# Patient Record
Sex: MALE | Race: WHITE | NOT HISPANIC OR LATINO | Employment: FULL TIME | URBAN - METROPOLITAN AREA
[De-identification: names, ages, dates, MRNs, and addresses within clinical notes are randomized per-mention and may not be internally consistent; named-entity substitution may affect disease eponyms.]

---

## 2020-01-22 ENCOUNTER — TELEPHONE (OUTPATIENT)
Dept: FAMILY MEDICINE | Facility: CLINIC | Age: 45
End: 2020-01-22

## 2020-01-22 NOTE — TELEPHONE ENCOUNTER
----- Message from Yumiko Jansen sent at 1/22/2020 11:10 AM CST -----  Refill for Delaxin. [facility]. Pt #[phone]

## 2020-02-03 ENCOUNTER — OFFICE VISIT (OUTPATIENT)
Dept: FAMILY MEDICINE | Facility: CLINIC | Age: 45
End: 2020-02-03
Payer: OTHER GOVERNMENT

## 2020-02-03 VITALS
HEART RATE: 68 BPM | DIASTOLIC BLOOD PRESSURE: 66 MMHG | WEIGHT: 232 LBS | HEIGHT: 71 IN | BODY MASS INDEX: 32.48 KG/M2 | SYSTOLIC BLOOD PRESSURE: 110 MMHG

## 2020-02-03 DIAGNOSIS — G89.29 OTHER CHRONIC PAIN: ICD-10-CM

## 2020-02-03 DIAGNOSIS — F32.A DEPRESSION, UNSPECIFIED DEPRESSION TYPE: Primary | ICD-10-CM

## 2020-02-03 DIAGNOSIS — Z00.00 ROUTINE PHYSICAL EXAMINATION: ICD-10-CM

## 2020-02-03 PROCEDURE — 99213 OFFICE O/P EST LOW 20 MIN: CPT | Mod: S$GLB,,, | Performed by: PHYSICIAN ASSISTANT

## 2020-02-03 PROCEDURE — 99213 PR OFFICE/OUTPT VISIT, EST, LEVL III, 20-29 MIN: ICD-10-PCS | Mod: S$GLB,,, | Performed by: PHYSICIAN ASSISTANT

## 2020-02-03 RX ORDER — DULOXETIN HYDROCHLORIDE 20 MG/1
20 CAPSULE, DELAYED RELEASE ORAL DAILY
COMMUNITY
Start: 2020-01-11 | End: 2020-02-03 | Stop reason: SDUPTHER

## 2020-02-03 RX ORDER — DULOXETIN HYDROCHLORIDE 20 MG/1
20 CAPSULE, DELAYED RELEASE ORAL DAILY
Qty: 90 CAPSULE | Refills: 1 | Status: SHIPPED | OUTPATIENT
Start: 2020-02-03 | End: 2020-07-22 | Stop reason: SDUPTHER

## 2020-02-03 NOTE — PROGRESS NOTES
SUBJECTIVE:    Patient ID: Román Acosta Jr is a 44 y.o. male.    Chief Complaint: Follow-up (no bottles// SW)    This is a 44-year-old white male who presents today for regular follow-up.  He was previously a patient of Macario Gonzáles and I am now seeing him.  Upon chart review it is evident that he is just taking Cymbalta for history of anxiety and depression.  He reports that he has been doing well. Really noticing a great improvement with the addition of the cymbalta. 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 Tried all sorts of minimally invasive things that have not helped. Dr. ESPERANZA Marroquin does not think he is a candidate for spine surgery.      No visits with results within 6 Month(s) from this visit.   Latest known visit with results is:   No results found for any previous visit.       History reviewed. No pertinent past medical history.  History reviewed. No pertinent surgical history.  History reviewed. No pertinent family history.    Marital Status:   Alcohol History:  reports that he does not drink alcohol.  Tobacco History:  reports that he has been smoking. His smokeless tobacco use includes snuff.  Drug History:  has no drug history on file.    Review of patient's allergies indicates:  No Known Allergies    Current Outpatient Medications:     DULoxetine (CYMBALTA) 20 MG capsule, Take 1 capsule (20 mg total) by mouth once daily., Disp: 90 capsule, Rfl: 1    Review of Systems   Constitutional: Negative for activity change, fatigue, fever and unexpected weight change.   HENT: Negative for congestion.    Respiratory: Negative for apnea, cough, chest tightness and shortness of breath.    Cardiovascular: Negative for chest pain and palpitations.   Gastrointestinal: Negative for abdominal distention and abdominal pain.   Genitourinary: Negative for difficulty urinating and dysuria.   Musculoskeletal: Positive for back pain and myalgias. Negative for arthralgias.   Neurological: Negative for  "dizziness and weakness.          Objective:      Vitals:    02/03/20 1513   BP: 110/66   Pulse: 68   Weight: 105.2 kg (232 lb)   Height: 5' 10.5" (1.791 m)     Physical Exam   Constitutional: He is oriented to person, place, and time. He appears well-developed and well-nourished. No distress.   HENT:   Head: Normocephalic and atraumatic.   Eyes: Pupils are equal, round, and reactive to light.   Neck: Normal range of motion. Neck supple. No thyromegaly present.   Cardiovascular: Normal rate, regular rhythm, normal heart sounds and intact distal pulses.   Pulmonary/Chest: Effort normal and breath sounds normal.   Abdominal: Soft. Bowel sounds are normal. He exhibits no distension. There is no tenderness.   Musculoskeletal: Normal range of motion.   Neurological: He is alert and oriented to person, place, and time. No cranial nerve deficit.   Skin: Skin is warm and dry. No rash noted. No erythema.         Assessment:       1. Depression, unspecified depression type    2. Other chronic pain    3. Routine physical examination         Plan:       Depression, unspecified depression type  Comments:  Mood and anxiety appears to be doing really well.  Continue Cymbalta as is.  Orders:  -     DULoxetine (CYMBALTA) 20 MG capsule; Take 1 capsule (20 mg total) by mouth once daily.  Dispense: 90 capsule; Refill: 1    Other chronic pain  Comments:  Has noticed some great improvement with the addition of Cymbalta.  No changes today    Routine physical examination  Comments:  He will be due for some blood work over the summer.  Reviewed what was done last year for Macario.      Follow up in about 6 months (around 8/3/2020) for Annual Physical.        2/3/2020 Hector Devine PA-C    "

## 2020-07-17 DIAGNOSIS — Z00.00 ROUTINE PHYSICAL EXAMINATION: Primary | ICD-10-CM

## 2020-07-18 LAB
ALBUMIN SERPL-MCNC: 4.4 G/DL (ref 3.6–5.1)
ALBUMIN/GLOB SERPL: 1.8 (CALC) (ref 1–2.5)
ALP SERPL-CCNC: 57 U/L (ref 36–130)
ALT SERPL-CCNC: 16 U/L (ref 9–46)
AST SERPL-CCNC: 13 U/L (ref 10–40)
BASOPHILS # BLD AUTO: 68 CELLS/UL (ref 0–200)
BASOPHILS NFR BLD AUTO: 1 %
BILIRUB SERPL-MCNC: 0.4 MG/DL (ref 0.2–1.2)
BUN SERPL-MCNC: 15 MG/DL (ref 7–25)
BUN/CREAT SERPL: NORMAL (CALC) (ref 6–22)
CALCIUM SERPL-MCNC: 9.5 MG/DL (ref 8.6–10.3)
CHLORIDE SERPL-SCNC: 103 MMOL/L (ref 98–110)
CHOLEST SERPL-MCNC: 181 MG/DL
CHOLEST/HDLC SERPL: 3.6 (CALC)
CO2 SERPL-SCNC: 31 MMOL/L (ref 20–32)
CREAT SERPL-MCNC: 0.92 MG/DL (ref 0.6–1.35)
EOSINOPHIL # BLD AUTO: 204 CELLS/UL (ref 15–500)
EOSINOPHIL NFR BLD AUTO: 3 %
ERYTHROCYTE [DISTWIDTH] IN BLOOD BY AUTOMATED COUNT: 13.1 % (ref 11–15)
GFRSERPLBLD MDRD-ARVRAT: 99 ML/MIN/1.73M2
GLOBULIN SER CALC-MCNC: 2.5 G/DL (CALC) (ref 1.9–3.7)
GLUCOSE SERPL-MCNC: 94 MG/DL (ref 65–99)
HCT VFR BLD AUTO: 46.5 % (ref 38.5–50)
HDLC SERPL-MCNC: 50 MG/DL
HGB BLD-MCNC: 15.3 G/DL (ref 13.2–17.1)
LDLC SERPL CALC-MCNC: 113 MG/DL (CALC)
LYMPHOCYTES # BLD AUTO: 2040 CELLS/UL (ref 850–3900)
LYMPHOCYTES NFR BLD AUTO: 30 %
MCH RBC QN AUTO: 29.7 PG (ref 27–33)
MCHC RBC AUTO-ENTMCNC: 32.9 G/DL (ref 32–36)
MCV RBC AUTO: 90.3 FL (ref 80–100)
MONOCYTES # BLD AUTO: 490 CELLS/UL (ref 200–950)
MONOCYTES NFR BLD AUTO: 7.2 %
NEUTROPHILS # BLD AUTO: 3998 CELLS/UL (ref 1500–7800)
NEUTROPHILS NFR BLD AUTO: 58.8 %
NONHDLC SERPL-MCNC: 131 MG/DL (CALC)
PLATELET # BLD AUTO: 202 THOUSAND/UL (ref 140–400)
PMV BLD REES-ECKER: 10.6 FL (ref 7.5–12.5)
POTASSIUM SERPL-SCNC: 4.3 MMOL/L (ref 3.5–5.3)
PROT SERPL-MCNC: 6.9 G/DL (ref 6.1–8.1)
RBC # BLD AUTO: 5.15 MILLION/UL (ref 4.2–5.8)
SODIUM SERPL-SCNC: 141 MMOL/L (ref 135–146)
TRIGL SERPL-MCNC: 85 MG/DL
TSH SERPL-ACNC: 1.32 MIU/L (ref 0.4–4.5)
WBC # BLD AUTO: 6.8 THOUSAND/UL (ref 3.8–10.8)

## 2020-07-27 ENCOUNTER — OFFICE VISIT (OUTPATIENT)
Dept: FAMILY MEDICINE | Facility: CLINIC | Age: 45
End: 2020-07-27
Payer: OTHER GOVERNMENT

## 2020-07-27 DIAGNOSIS — F32.A DEPRESSION, UNSPECIFIED DEPRESSION TYPE: ICD-10-CM

## 2020-07-27 PROCEDURE — 99396 PREV VISIT EST AGE 40-64: CPT | Mod: 95,,, | Performed by: PHYSICIAN ASSISTANT

## 2020-07-27 PROCEDURE — 99396 PR PREVENTIVE VISIT,EST,40-64: ICD-10-PCS | Mod: 95,,, | Performed by: PHYSICIAN ASSISTANT

## 2020-07-27 RX ORDER — DULOXETIN HYDROCHLORIDE 20 MG/1
20 CAPSULE, DELAYED RELEASE ORAL DAILY
Qty: 90 CAPSULE | Refills: 1 | Status: SHIPPED | OUTPATIENT
Start: 2020-07-27 | End: 2021-01-18 | Stop reason: SDUPTHER

## 2020-07-27 NOTE — PROGRESS NOTES
Subjective:        The chief complaint leading to consultation is: 6 month checkup  The patient location is:  Home  Visit type: Virtual visit with synchronous audio/video or audio only  This was a video visit in lieu of in-person visit due to the coronavirus emergency. Patient acknowledged and consented to the video visit encounter.     45 yo wm presents for regular checkup and refills. Reports that he is managing pretty well. Work has stayed pretty busy. Just had annual labs done. All look stable. No new concerns or complaints at this time.      History reviewed. No pertinent surgical history.  History reviewed. No pertinent past medical history.  History reviewed. No pertinent family history.     Social History:   Marital Status:   Alcohol History:  reports no history of alcohol use.  Tobacco History:  reports that he has been smoking. His smokeless tobacco use includes snuff.  Drug History:  has no history on file for drug.    Review of patient's allergies indicates:  No Known Allergies    Current Outpatient Medications   Medication Sig Dispense Refill    DULoxetine (CYMBALTA) 20 MG capsule Take 1 capsule (20 mg total) by mouth once daily. 90 capsule 1     No current facility-administered medications for this visit.        Review of Systems   Constitutional: Negative for activity change, fatigue, fever and unexpected weight change.   HENT: Negative for congestion.    Respiratory: Negative for apnea, cough, chest tightness and shortness of breath.    Cardiovascular: Negative for chest pain and palpitations.   Gastrointestinal: Negative for abdominal distention and abdominal pain.   Genitourinary: Negative for difficulty urinating and dysuria.   Musculoskeletal: Negative for arthralgias and back pain.   Neurological: Negative for dizziness and weakness.         Objective:        Physical Exam:   Physical Exam  Constitutional:       General: He is not in acute distress.     Appearance: He is  well-developed.   HENT:      Head: Normocephalic and atraumatic.   Eyes:      Pupils: Pupils are equal, round, and reactive to light.   Neck:      Musculoskeletal: Normal range of motion and neck supple.      Thyroid: No thyromegaly.   Pulmonary:      Effort: Pulmonary effort is normal.   Abdominal:      General: Bowel sounds are normal. There is no distension.      Palpations: Abdomen is soft.      Tenderness: There is no abdominal tenderness.   Musculoskeletal: Normal range of motion.   Skin:     General: Skin is warm and dry.      Findings: No erythema or rash.   Neurological:      Mental Status: He is alert and oriented to person, place, and time.      Cranial Nerves: No cranial nerve deficit.              Assessment:       1. Depression, unspecified depression type      Plan:   Depression, unspecified depression type  Comments:  Mood and anxiety appears to be doing really well.  Continue Cymbalta as is. f/u 6 mos in person.  Orders:  -     DULoxetine (CYMBALTA) 20 MG capsule; Take 1 capsule (20 mg total) by mouth once daily.  Dispense: 90 capsule; Refill: 1      Follow up in about 6 months (around 1/27/2021) for checkup in person.    Total time spent with patient: 15min    Each patient to whom he or she provides medical services by telemedicine is:  (1) informed of the relationship between the physician and patient and the respective role of any other health care provider with respect to management of the patient; and (2) notified that he or she may decline to receive medical services by telemedicine and may withdraw from such care at any time.    This note was created using [employer] voice recognition software that occasionally misinterprets phrases or words.

## 2020-07-27 NOTE — PATIENT INSTRUCTIONS
Prevention Guidelines, Men Ages 40 to 49  Screening tests and vaccines are an important part of managing your health. Health counseling is essential, too. Below are guidelines for these, for men ages 40 to 49. Talk with your healthcare provider to make sure youre up to date on what you need.  Screening Who needs it How often   Alcohol misuse All men in this age group At routine exams   Blood pressure All men in this age group Every 2 years if your blood pressure reading is less than 120/80 mm Hg; yearly if your systolic blood pressure reading is 120 to 139 mm Hg, or your diastolic blood pressure reading is 80 to 89 mm Hg   Depression All men in this age group At routine exams   Type 2 diabetes or prediabetes All adults beginning at age 45 and adults without symptoms at any age who are overweight or obese and have 1 or more other risk factors for diabetes At least every 3 years (yearly if blood sugar has begun to rise)   Hepatitis C Men at increased risk for infection - talk with your healthcare provider At routine exams   High cholesterol or triglycerides All men ages 35 and older, and younger men at high risk for coronary artery disease At least every 5 years   HIV All men At routine exams   Obesity All men in this age group At routine exams   Prostate cancer Starting at age 45, talk to healthcare provider about risks and benefits of digital rectal exam (CLAUDIA) and prostate-specific antigen (PSA) screening1 At routine exams   Syphilis Men at increased risk for infection - talk with your healthcare provider At routine exams   Tuberculosis Men at increased risk for infection - talk with your healthcare provider Check with your healthcare provider   Vision All men in this age group Every 2 to 4 years if no risk factors for eye disease2   Vaccine Who needs it How often   Chickenpox (varicella) All men in this age group who have no record of this infection or vaccine 2 doses; the second dose should be given at least 4  weeks after the first dose   Hepatitis A Men at increased risk for infection - talk with your healthcare provider 2 doses given at least 6 months apart   Hepatitis B Men at increased risk for infection - talk with your healthcare provider 3 doses over 6 months; second dose should be given 1 month after the first dose; the third dose should be given at least 2 months after the second dose and at least 4 months after the first dose   Haemophilus influenzae Type B (HIB) Men at increased risk for infection - talk with your healthcare provider 1 to 3 doses   Influenza (flu) All men in this age group Once a year   Measles, mumps, rubella (MMR) All men in this age group who have no record of these infections or vaccines 1 or 2 doses   Meningococcal Men at increased risk for infection - talk with your healthcare provider 1 or more doses   Pneumococcal conjugate vaccine (PCV13) and pneumococcal polysaccharide vaccine (PPSV23) Men at increased risk for infection - talk with your healthcare provider PCV13: 1 dose ages 19 to 65 (protects against 13 types of pneumococcal bacteria)     PPSV23: 1 to 2 doses through age 64, or 1 dose at 65 or older (protects against 23 types of pneumococcal bacteria)      Tetanus/diphtheria/  pertussis (Td/Tdap) booster All men in this age group Td every 10 years, or a one-time dose of Tdap instead of a Td booster after age 18, then Td every 10 years   Counseling Who needs it How often   Diet and exercise Men who are overweight or obese When diagnosed, and then at routine exams   Sexually transmitted infection prevention Men at increased risk for infection - talk with your healthcare provider At routine exams   Use of daily aspirin Men ages 45 to 79 at risk for cardiovascular health problems At routine exams   Use of tobacco and the health effects it can cause All men in this age group Every exam   [address] [place] [address] [facility]   2[facility] [employer]  Date Last Reviewed:  2/1/2017  © 6254-3838 [employer], [email or web address]. [address] [place] [address], [place] [address]. All rights reserved. This information is not intended as a substitute for professional medical care. Always follow your healthcare professional's instructions.

## 2021-01-18 ENCOUNTER — OFFICE VISIT (OUTPATIENT)
Dept: FAMILY MEDICINE | Facility: CLINIC | Age: 46
End: 2021-01-18
Payer: OTHER GOVERNMENT

## 2021-01-18 VITALS
WEIGHT: 227 LBS | HEART RATE: 84 BPM | DIASTOLIC BLOOD PRESSURE: 88 MMHG | SYSTOLIC BLOOD PRESSURE: 136 MMHG | HEIGHT: 71 IN | BODY MASS INDEX: 31.78 KG/M2

## 2021-01-18 DIAGNOSIS — G43.909 MIGRAINE WITHOUT STATUS MIGRAINOSUS, NOT INTRACTABLE, UNSPECIFIED MIGRAINE TYPE: Primary | ICD-10-CM

## 2021-01-18 DIAGNOSIS — F32.A DEPRESSION, UNSPECIFIED DEPRESSION TYPE: ICD-10-CM

## 2021-01-18 PROCEDURE — 99213 PR OFFICE/OUTPT VISIT, EST, LEVL III, 20-29 MIN: ICD-10-PCS | Mod: S$GLB,,, | Performed by: PHYSICIAN ASSISTANT

## 2021-01-18 PROCEDURE — 99213 OFFICE O/P EST LOW 20 MIN: CPT | Mod: S$GLB,,, | Performed by: PHYSICIAN ASSISTANT

## 2021-01-18 RX ORDER — VITAMIN B COMPLEX
1 CAPSULE ORAL DAILY
COMMUNITY

## 2021-01-18 RX ORDER — CHOLECALCIFEROL (VITAMIN D3) 25 MCG
1000 TABLET ORAL DAILY
COMMUNITY

## 2021-01-18 RX ORDER — DULOXETIN HYDROCHLORIDE 20 MG/1
20 CAPSULE, DELAYED RELEASE ORAL DAILY
Qty: 90 CAPSULE | Refills: 1 | Status: SHIPPED | OUTPATIENT
Start: 2021-01-18 | End: 2021-07-13 | Stop reason: SDUPTHER

## 2021-01-18 RX ORDER — ZINC GLUCONATE 50 MG
50 TABLET ORAL DAILY
COMMUNITY

## 2021-01-18 RX ORDER — UBIDECARENONE 30 MG
30 CAPSULE ORAL 3 TIMES DAILY
COMMUNITY

## 2021-01-18 RX ORDER — UBROGEPANT 100 MG/1
100 TABLET ORAL DAILY PRN
Qty: 10 TABLET | Refills: 2 | Status: SHIPPED | OUTPATIENT
Start: 2021-01-18 | End: 2021-04-29 | Stop reason: SDUPTHER

## 2021-04-29 DIAGNOSIS — G43.909 MIGRAINE WITHOUT STATUS MIGRAINOSUS, NOT INTRACTABLE, UNSPECIFIED MIGRAINE TYPE: ICD-10-CM

## 2021-04-29 RX ORDER — UBROGEPANT 100 MG/1
100 TABLET ORAL DAILY PRN
Qty: 10 TABLET | Refills: 2 | Status: SHIPPED | OUTPATIENT
Start: 2021-04-29 | End: 2021-07-13 | Stop reason: SDUPTHER

## 2021-05-24 ENCOUNTER — TELEPHONE (OUTPATIENT)
Dept: FAMILY MEDICINE | Facility: CLINIC | Age: 46
End: 2021-05-24

## 2021-07-08 ENCOUNTER — TELEPHONE (OUTPATIENT)
Dept: FAMILY MEDICINE | Facility: CLINIC | Age: 46
End: 2021-07-08

## 2021-07-13 ENCOUNTER — OFFICE VISIT (OUTPATIENT)
Dept: FAMILY MEDICINE | Facility: CLINIC | Age: 46
End: 2021-07-13
Payer: OTHER GOVERNMENT

## 2021-07-13 VITALS
HEART RATE: 56 BPM | SYSTOLIC BLOOD PRESSURE: 124 MMHG | BODY MASS INDEX: 31.45 KG/M2 | DIASTOLIC BLOOD PRESSURE: 72 MMHG | WEIGHT: 224.63 LBS | HEIGHT: 71 IN

## 2021-07-13 DIAGNOSIS — F32.A DEPRESSION, UNSPECIFIED DEPRESSION TYPE: ICD-10-CM

## 2021-07-13 DIAGNOSIS — H60.90 OTITIS EXTERNA, UNSPECIFIED CHRONICITY, UNSPECIFIED LATERALITY, UNSPECIFIED TYPE: Primary | ICD-10-CM

## 2021-07-13 DIAGNOSIS — G43.909 MIGRAINE WITHOUT STATUS MIGRAINOSUS, NOT INTRACTABLE, UNSPECIFIED MIGRAINE TYPE: ICD-10-CM

## 2021-07-13 PROCEDURE — 99214 PR OFFICE/OUTPT VISIT, EST, LEVL IV, 30-39 MIN: ICD-10-PCS | Mod: S$GLB,,, | Performed by: PHYSICIAN ASSISTANT

## 2021-07-13 PROCEDURE — 99214 OFFICE O/P EST MOD 30 MIN: CPT | Mod: S$GLB,,, | Performed by: PHYSICIAN ASSISTANT

## 2021-07-13 RX ORDER — DULOXETIN HYDROCHLORIDE 20 MG/1
20 CAPSULE, DELAYED RELEASE ORAL DAILY
Qty: 90 CAPSULE | Refills: 1 | Status: SHIPPED | OUTPATIENT
Start: 2021-07-13 | End: 2022-01-13 | Stop reason: SDUPTHER

## 2021-07-13 RX ORDER — UBROGEPANT 100 MG/1
100 TABLET ORAL DAILY PRN
Qty: 10 TABLET | Refills: 2 | Status: SHIPPED | OUTPATIENT
Start: 2021-07-13 | End: 2021-11-05 | Stop reason: SDUPTHER

## 2021-07-13 RX ORDER — NEOMYCIN SULFATE, POLYMYXIN B SULFATE AND HYDROCORTISONE 10; 3.5; 1 MG/ML; MG/ML; [USP'U]/ML
3 SUSPENSION/ DROPS AURICULAR (OTIC) 3 TIMES DAILY
Qty: 6 ML | Refills: 0 | Status: SHIPPED | OUTPATIENT
Start: 2021-07-13 | End: 2021-07-23

## 2021-07-13 RX ORDER — AMOXICILLIN AND CLAVULANATE POTASSIUM 875; 125 MG/1; MG/1
1 TABLET, FILM COATED ORAL EVERY 12 HOURS
Qty: 20 TABLET | Refills: 0 | Status: SHIPPED | OUTPATIENT
Start: 2021-07-13 | End: 2021-07-23

## 2021-11-05 DIAGNOSIS — G43.909 MIGRAINE WITHOUT STATUS MIGRAINOSUS, NOT INTRACTABLE, UNSPECIFIED MIGRAINE TYPE: ICD-10-CM

## 2021-11-05 RX ORDER — UBROGEPANT 100 MG/1
100 TABLET ORAL DAILY PRN
Qty: 10 TABLET | Refills: 2 | Status: SHIPPED | OUTPATIENT
Start: 2021-11-05 | End: 2022-01-13 | Stop reason: SDUPTHER

## 2021-12-15 ENCOUNTER — TELEPHONE (OUTPATIENT)
Dept: FAMILY MEDICINE | Facility: CLINIC | Age: 46
End: 2021-12-15
Payer: OTHER GOVERNMENT

## 2021-12-15 DIAGNOSIS — G47.33 OSA (OBSTRUCTIVE SLEEP APNEA): Primary | ICD-10-CM

## 2021-12-27 ENCOUNTER — TELEPHONE (OUTPATIENT)
Dept: FAMILY MEDICINE | Facility: CLINIC | Age: 46
End: 2021-12-27
Payer: OTHER GOVERNMENT

## 2021-12-27 DIAGNOSIS — Z00.00 ROUTINE GENERAL MEDICAL EXAMINATION AT A HEALTH CARE FACILITY: Primary | ICD-10-CM

## 2021-12-27 DIAGNOSIS — Z79.899 ENCOUNTER FOR LONG-TERM (CURRENT) USE OF OTHER MEDICATIONS: ICD-10-CM

## 2022-01-08 LAB
ALBUMIN SERPL-MCNC: 4.5 G/DL (ref 3.6–5.1)
ALBUMIN/GLOB SERPL: 1.6 (CALC) (ref 1–2.5)
ALP SERPL-CCNC: 65 U/L (ref 36–130)
ALT SERPL-CCNC: 29 U/L (ref 9–46)
AST SERPL-CCNC: 18 U/L (ref 10–40)
BASOPHILS # BLD AUTO: 57 CELLS/UL (ref 0–200)
BASOPHILS NFR BLD AUTO: 0.8 %
BILIRUB SERPL-MCNC: 0.4 MG/DL (ref 0.2–1.2)
BUN SERPL-MCNC: 16 MG/DL (ref 7–25)
BUN/CREAT SERPL: ABNORMAL (CALC) (ref 6–22)
CALCIUM SERPL-MCNC: 9.8 MG/DL (ref 8.6–10.3)
CHLORIDE SERPL-SCNC: 105 MMOL/L (ref 98–110)
CHOLEST SERPL-MCNC: 190 MG/DL
CHOLEST/HDLC SERPL: 3.8 (CALC)
CO2 SERPL-SCNC: 30 MMOL/L (ref 20–32)
CREAT SERPL-MCNC: 1.17 MG/DL (ref 0.6–1.35)
EOSINOPHIL # BLD AUTO: 249 CELLS/UL (ref 15–500)
EOSINOPHIL NFR BLD AUTO: 3.5 %
ERYTHROCYTE [DISTWIDTH] IN BLOOD BY AUTOMATED COUNT: 13.8 % (ref 11–15)
GLOBULIN SER CALC-MCNC: 2.8 G/DL (CALC) (ref 1.9–3.7)
GLUCOSE SERPL-MCNC: 101 MG/DL (ref 65–99)
HCT VFR BLD AUTO: 47.6 % (ref 38.5–50)
HDLC SERPL-MCNC: 50 MG/DL
HGB BLD-MCNC: 15.3 G/DL (ref 13.2–17.1)
LDLC SERPL CALC-MCNC: 118 MG/DL (CALC)
LYMPHOCYTES # BLD AUTO: 2300 CELLS/UL (ref 850–3900)
LYMPHOCYTES NFR BLD AUTO: 32.4 %
MCH RBC QN AUTO: 29 PG (ref 27–33)
MCHC RBC AUTO-ENTMCNC: 32.1 G/DL (ref 32–36)
MCV RBC AUTO: 90.2 FL (ref 80–100)
MONOCYTES # BLD AUTO: 547 CELLS/UL (ref 200–950)
MONOCYTES NFR BLD AUTO: 7.7 %
NEUTROPHILS # BLD AUTO: 3948 CELLS/UL (ref 1500–7800)
NEUTROPHILS NFR BLD AUTO: 55.6 %
NONHDLC SERPL-MCNC: 140 MG/DL (CALC)
PLATELET # BLD AUTO: 212 THOUSAND/UL (ref 140–400)
PMV BLD REES-ECKER: 11.2 FL (ref 7.5–12.5)
POTASSIUM SERPL-SCNC: 4.8 MMOL/L (ref 3.5–5.3)
PROT SERPL-MCNC: 7.3 G/DL (ref 6.1–8.1)
RBC # BLD AUTO: 5.28 MILLION/UL (ref 4.2–5.8)
SODIUM SERPL-SCNC: 142 MMOL/L (ref 135–146)
TRIGL SERPL-MCNC: 111 MG/DL
TSH SERPL-ACNC: 1.93 MIU/L (ref 0.4–4.5)
WBC # BLD AUTO: 7.1 THOUSAND/UL (ref 3.8–10.8)

## 2022-01-13 ENCOUNTER — OFFICE VISIT (OUTPATIENT)
Dept: FAMILY MEDICINE | Facility: CLINIC | Age: 47
End: 2022-01-13
Payer: OTHER GOVERNMENT

## 2022-01-13 VITALS
HEART RATE: 68 BPM | SYSTOLIC BLOOD PRESSURE: 136 MMHG | BODY MASS INDEX: 33.04 KG/M2 | HEIGHT: 71 IN | DIASTOLIC BLOOD PRESSURE: 84 MMHG | WEIGHT: 236 LBS | OXYGEN SATURATION: 96 %

## 2022-01-13 DIAGNOSIS — G43.909 MIGRAINE WITHOUT STATUS MIGRAINOSUS, NOT INTRACTABLE, UNSPECIFIED MIGRAINE TYPE: ICD-10-CM

## 2022-01-13 DIAGNOSIS — F32.A DEPRESSION, UNSPECIFIED DEPRESSION TYPE: Primary | ICD-10-CM

## 2022-01-13 PROCEDURE — 99396 PREV VISIT EST AGE 40-64: CPT | Mod: S$GLB,,, | Performed by: PHYSICIAN ASSISTANT

## 2022-01-13 PROCEDURE — 99396 PR PREVENTIVE VISIT,EST,40-64: ICD-10-PCS | Mod: S$GLB,,, | Performed by: PHYSICIAN ASSISTANT

## 2022-01-13 RX ORDER — DULOXETIN HYDROCHLORIDE 20 MG/1
20 CAPSULE, DELAYED RELEASE ORAL DAILY
Qty: 90 CAPSULE | Refills: 1 | Status: SHIPPED | OUTPATIENT
Start: 2022-01-13 | End: 2022-08-11 | Stop reason: SDUPTHER

## 2022-01-13 RX ORDER — UBROGEPANT 100 MG/1
100 TABLET ORAL DAILY PRN
Qty: 30 TABLET | Refills: 2 | Status: SHIPPED | OUTPATIENT
Start: 2022-01-13

## 2022-01-13 NOTE — PROGRESS NOTES
SUBJECTIVE:    Patient ID: Román Acosta Jr is a 46 y.o. male.    Chief Complaint: Follow-up (6 mo f/u//no med bottles//declined flu vac//tc)    This is a 45 yo wm presenting for regular checkup and refills. He just completed labs for my review. All look stable at this time. 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 Some stress with this. Just covered with ubrelvy for migraine . Cymbalta effective for mood/anxiety.       Telephone on 2021   Component Date Value Ref Range Status    WBC 2022 7.1  3.8 - 10.8 Thousand/uL Final    RBC 2022 5.28  4.20 - 5.80 Million/uL Final    Hemoglobin 2022 15.3  13.2 - 17.1 g/dL Final    Hematocrit 2022 47.6  38.5 - 50.0 % Final    MCV 2022 90.2  80.0 - 100.0 fL Final    MCH 2022 29.0  27.0 - 33.0 pg Final    MCHC 2022 32.1  32.0 - 36.0 g/dL Final    RDW 2022 13.8  11.0 - 15.0 % Final    Platelets 2022 212  140 - 400 Thousand/uL Final    MPV 2022 11.2  7.5 - 12.5 fL Final    Neutrophils, Abs 2022 3,948  1,500 - 7,800 cells/uL Final    Lymph # 2022 2,300  850 - 3,900 cells/uL Final    Mono # 2022 547  200 - 950 cells/uL Final    Eos # 2022 249  15 - 500 cells/uL Final    Baso # 2022 57  0 - 200 cells/uL Final    Neutrophils Relative 2022 55.6  % Final    Lymph % 2022 32.4  % Final    Mono % 2022 7.7  % Final    Eosinophil % 2022 3.5  % Final    Basophil % 2022 0.8  % Final    Glucose 2022 101* 65 - 99 mg/dL Final    BUN 2022 16  7 - 25 mg/dL Final    Creatinine 2022 1.17  0.60 - 1.35 mg/dL Final    eGFR if non  2022 73  > OR = 60 mL/min/1.73m2 Final    eGFR if  2022 85  > OR = 60 mL/min/1.73m2 Final    BUN/Creatinine Ratio 2022 NOT APPLICABLE   (calc) Final    Sodium 2022 142  135 - 146 mmol/L Final     Potassium 01/07/2022 4.8  3.5 - 5.3 mmol/L Final    Chloride 01/07/2022 105  98 - 110 mmol/L Final    CO2 01/07/2022 30  20 - 32 mmol/L Final    Calcium 01/07/2022 9.8  8.6 - 10.3 mg/dL Final    Total Protein 01/07/2022 7.3  6.1 - 8.1 g/dL Final    Albumin 01/07/2022 4.5  3.6 - 5.1 g/dL Final    Globulin, Total 01/07/2022 2.8  1.9 - 3.7 g/dL (calc) Final    Albumin/Globulin Ratio 01/07/2022 1.6  1.0 - 2.5 (calc) Final    Total Bilirubin 01/07/2022 0.4  0.2 - 1.2 mg/dL Final    Alkaline Phosphatase 01/07/2022 65  36 - 130 U/L Final    AST 01/07/2022 18  10 - 40 U/L Final    ALT 01/07/2022 29  9 - 46 U/L Final    Cholesterol 01/07/2022 190  <200 mg/dL Final    HDL 01/07/2022 50  > OR = 40 mg/dL Final    Triglycerides 01/07/2022 111  <150 mg/dL Final    LDL Cholesterol 01/07/2022 118* mg/dL (calc) Final    HDL/Cholesterol Ratio 01/07/2022 3.8  <5.0 (calc) Final    Non HDL Chol. (LDL+VLDL) 01/07/2022 140* <130 mg/dL (calc) Final    TSH w/reflex to FT4 01/07/2022 1.93  0.40 - 4.50 mIU/L Final       No past medical history on file.  No past surgical history on file.  No family history on file.    Marital Status:   Alcohol History:  reports no history of alcohol use.  Tobacco History:  reports that he has been smoking. His smokeless tobacco use includes snuff.  Drug History:  has no history on file for drug use.    Review of patient's allergies indicates:  No Known Allergies    Current Outpatient Medications:     ascorbic acid, vitamin C, (VITAMIN C) 100 MG tablet, Take 100 mg by mouth once daily., Disp: , Rfl:     b complex vitamins capsule, Take 1 capsule by mouth once daily., Disp: , Rfl:     cetirizine HCl (ALLERGY RELIEF, CETIRIZINE, ORAL), Take by mouth., Disp: , Rfl:     co-enzyme Q-10 30 mg capsule, Take 30 mg by mouth 3 (three) times daily., Disp: , Rfl:     DULoxetine (CYMBALTA) 20 MG capsule, Take 1 capsule (20 mg total) by mouth once daily., Disp: 90 capsule, Rfl: 1    ubrogepant  "(UBROGEPANT) 100 mg tablet, Take 1 tablet (100 mg total) by mouth daily as needed for Migraine., Disp: 30 tablet, Rfl: 2    vitamin D (VITAMIN D3) 1000 units Tab, Take 1,000 Units by mouth once daily., Disp: , Rfl:     vitamin E 100 UNIT capsule, Take 100 Units by mouth once daily., Disp: , Rfl:     zinc gluconate 50 mg tablet, Take 50 mg by mouth once daily., Disp: , Rfl:     Review of Systems   Constitutional: Negative for activity change, fatigue, fever and unexpected weight change.   HENT: Negative for congestion.    Respiratory: Negative for apnea, cough, chest tightness and shortness of breath.    Cardiovascular: Negative for chest pain and palpitations.   Gastrointestinal: Negative for abdominal distention and abdominal pain.   Genitourinary: Negative for difficulty urinating and dysuria.   Musculoskeletal: Negative for arthralgias and back pain.   Neurological: Positive for headaches (Migraine). Negative for dizziness and weakness.          Objective:      Vitals:    01/13/22 1107   BP: 136/84   Pulse: 68   SpO2: 96%   Weight: 107 kg (236 lb)   Height: 5' 10.5" (1.791 m)     Physical Exam  Constitutional:       General: He is not in acute distress.     Appearance: He is well-developed.   HENT:      Head: Normocephalic and atraumatic.   Eyes:      Pupils: Pupils are equal, round, and reactive to light.   Neck:      Thyroid: No thyromegaly.   Cardiovascular:      Rate and Rhythm: Normal rate and regular rhythm.      Heart sounds: Normal heart sounds.   Pulmonary:      Effort: Pulmonary effort is normal.      Breath sounds: Normal breath sounds.   Abdominal:      General: Bowel sounds are normal. There is no distension.      Palpations: Abdomen is soft.      Tenderness: There is no abdominal tenderness.   Musculoskeletal:         General: Normal range of motion.      Cervical back: Normal range of motion and neck supple.   Skin:     General: Skin is warm and dry.      Findings: No erythema or rash. "   Neurological:      Mental Status: He is alert and oriented to person, place, and time.      Cranial Nerves: No cranial nerve deficit.           Assessment:       1. Depression, unspecified depression type    2. Migraine without status migrainosus, not intractable, unspecified migraine type         Plan:       Depression, unspecified depression type  Comments:  Mood and anxiety appears to be doing really well.  Continue Cymbalta as is. f/u 6 mos in person.  Orders:  -     DULoxetine (CYMBALTA) 20 MG capsule; Take 1 capsule (20 mg total) by mouth once daily.  Dispense: 90 capsule; Refill: 1    Migraine without status migrainosus, not intractable, unspecified migraine type  Comments:  Going to try on ubrelvy as well. Abortive therapy. Will get coupon card offline and take to pharmacy.  Orders:  -     ubrogepant (UBROGEPANT) 100 mg tablet; Take 1 tablet (100 mg total) by mouth daily as needed for Migraine.  Dispense: 30 tablet; Refill: 2      Follow up in about 6 months (around 7/13/2022) for checkup.        1/13/2022 Hector Devine PA-C

## 2022-01-13 NOTE — PATIENT INSTRUCTIONS
"Patient Education       Migraines in Adults   The Basics   Written by the doctors and editors at [facility]   What are migraines? -- Migraines are a kind of headache that can also involve other symptoms. Migraines can affect both adults and children. They are more common in women than in men. Migraines often start off mild and then get worse.  What are the symptoms of migraines in adults? -- Symptoms can include:  · Headache - The headache gets worse over several hours and is usually throbbing. It often affects 1 side of the head.  · Nausea and sometimes vomiting  · Feeling sensitive to light and noise - Lying down in a quiet, dark room often helps.  · Aura - Some people have something called a migraine "aura." An aura is a symptom or feeling that happens before or during the migraine headache. Each person's aura is different, but in most cases the aura affects the vision. You might see flashing lights, bright spots, or zig-zag lines, or lose part of your vision. Or you might have numbness and tingling of the lips, lower face, and fingers of 1 hand. Some people hear sounds or have ringing in their ears as part of their aura. The aura usually lasts a few minutes to an hour and then goes away, but most often lasts 15 to 30 minutes.  Women who get migraines with aura usually cannot take birth control pills. That's because they might increase the risk of stroke.  Many people get other symptoms of migraine that happen several hours or even a day before the headache. Doctors call these "premonitory" or "prodromal" symptoms. They might include yawning, feeling depressed, irritability, food cravings, constipation, or a stiff neck.  Is there a test for migraines? -- No. There is no test. But your doctor should be able to tell if you have migraines by doing an exam and learning about your symptoms.  Should I see a doctor or nurse? -- Yes. If you think you are having migraines, you should talk to your doctor or nurse. You should " "also see a doctor or nurse if your migraines get worse or more frequent, or if you have new symptoms.  Is there anything I can do to prevent migraines? -- Yes. Some people find that their migraines are triggered by certain things. If you can avoid some of these things, you can lower your chances of getting migraines.  You can also keep a "headache calendar." In the calendar, write down every time you have a migraine and what you ate and did before it started. That way you can find out if there is anything you should avoid eating or doing. You can also write down what medicine you took and whether or not it helped.  Common migraine triggers include:  · Stress  · Hormonal changes  · Skipping meals or not eating enough  · Changes in the weather  · Sleeping too much or too little  · Bright or flashing lights  · Drinking alcohol  · Certain drinks or foods, such as red wine, aged cheese, and hot dogs  If your migraines are frequent or severe, your doctor can suggest others ways to help prevent them. For example, it might help to learn relaxation techniques and ways to manage stress. There are also medicines that can help.  Some women get migraines just before or during their period. Medicine can help with this, too.  How are migraines treated? -- There are many different medicines that can help with migraines. Your doctor can help you find the best treatment for your situation.  For mild migraines, your doctor might suggest an over-the-counter medicine such as acetaminophen (sample brand name: Tylenol), ibuprofen (sample brand names: Advil, Motrin), or naproxen (sample brand name: Aleve). There is also a medicine that combines acetaminophen, aspirin, and caffeine (sample brand name: Excedrin).  For more severe migraines, there are prescription medicines that can help. Some, such as medicines called triptans, help to relieve the pain from a migraine attack. Other prescription medicines can help to make migraine attacks " happen less often. If you have severe nausea or vomiting with your migraines, there are medicines that can help with that, too.   Do not try to treat frequent migraines on your own with non-prescription pain medicines. Taking non-prescription pain medicines too often can actually cause more headaches later.  What if I want to get pregnant? -- If you want to get pregnant, talk to your doctor or nurse about it before you start trying. Some medicines used to treat and prevent migraines are not safe during pregnancy, so you might need to switch medicines before you get pregnant.  Some women notice that their migraines actually get better during pregnancy and breastfeeding. This is related to hormonal changes in the body.  All topics are updated as new evidence becomes available and our peer review process is complete.  This topic retrieved from [employer] on: Sep 21, 2021.  Topic 602465 Version 5.0  Release: 29.4.2 - C29.263  © 2021 UpToDate, Inc. and/or its affiliates. All rights reserved.  Consumer Information Use and Disclaimer   This information is not specific medical advice and does not replace information you receive from your health care provider. This is only a brief summary of general information. It does NOT include all information about conditions, illnesses, injuries, tests, procedures, treatments, therapies, discharge instructions or life-style choices that may apply to you. You must talk with your health care provider for complete information about your health and treatment options. This information should not be used to decide whether or not to accept your health care provider's advice, instructions or recommendations. Only your health care provider has the knowledge and training to provide advice that is right for you. The use of this information is governed by the [employer] End User License Agreement, available at https://www.Q.ME.[employer]/en/solutions/Nubee/about/kedar.The use of [employer] content  is governed by the [facility] Terms of Use. ©2021 [employer]. All rights reserved.  Copyright   © 2021 [employer] and/or its affiliates. All rights reserved.

## 2022-03-08 ENCOUNTER — TELEPHONE (OUTPATIENT)
Dept: FAMILY MEDICINE | Facility: CLINIC | Age: 47
End: 2022-03-08
Payer: OTHER GOVERNMENT

## 2022-03-08 NOTE — TELEPHONE ENCOUNTER
----- Message from Domi Moreau sent at 3/8/2022  9:10 AM CST -----  Patient wife(Chikis) called and stated that the pharmacy advised her that they need a PA for his ubrogepant if any questions [phone] he uses [facility] in [place]

## 2022-03-09 ENCOUNTER — TELEPHONE (OUTPATIENT)
Dept: FAMILY MEDICINE | Facility: CLINIC | Age: 47
End: 2022-03-09
Payer: OTHER GOVERNMENT

## 2022-03-09 NOTE — TELEPHONE ENCOUNTER
Informed pt  Ubrelvy co pay card is at  ready for . He voiced understanding. Pt states he had to use it last year.

## 2022-03-09 NOTE — TELEPHONE ENCOUNTER
----- Message from Yoselin Castillo sent at 3/9/2022 11:29 AM CST -----  The PA for Ubrelvy has been denied because the pt is not being treated by a Neurologist for his dx and does not have a history of tried and failed medications. The pt has been notified. Do we have any co-pay cards?

## 2022-03-15 ENCOUNTER — TELEPHONE (OUTPATIENT)
Dept: FAMILY MEDICINE | Facility: CLINIC | Age: 47
End: 2022-03-15
Payer: OTHER GOVERNMENT

## 2022-03-15 NOTE — TELEPHONE ENCOUNTER
----- Message from Domi Moreau sent at 3/15/2022 10:39 AM CDT -----  Patient wife (Chikis)called and stated she is still having problems with the pharmacy [facility]  they keep telling her that they still need a PA for his ubrogepant she advised them that  this has been sent to them multiple times but they stated they still do not have one please give her a call at [phone]

## 2022-03-16 ENCOUNTER — TELEPHONE (OUTPATIENT)
Dept: FAMILY MEDICINE | Facility: CLINIC | Age: 47
End: 2022-03-16
Payer: OTHER GOVERNMENT

## 2022-03-16 NOTE — TELEPHONE ENCOUNTER
----- Message from Yoselin Castillo sent at 3/16/2022 10:08 AM CDT -----  BREE  Pt came in office and signed the Appeal paperwork for Ubrelvy. It has been signed, scanned in media and right faxed jeffrey  Appeals Dept @ [phone] confirmed successful.  Appeal can take up to 30 days I did give the pt the coupon card that was up front as well.

## 2022-03-29 ENCOUNTER — TELEPHONE (OUTPATIENT)
Dept: FAMILY MEDICINE | Facility: CLINIC | Age: 47
End: 2022-03-29
Payer: OTHER GOVERNMENT

## 2022-03-29 NOTE — TELEPHONE ENCOUNTER
----- Message from Yoselin Castillo sent at 3/29/2022  9:57 AM CDT -----  The Appeal for Ubrelvy has been Denied per insurance the original denial stands.  A copy of the full appeal denial letter is scanned in the media. I have not notified the pt was waiting to hear back.

## 2022-03-30 NOTE — TELEPHONE ENCOUNTER
This pt should be using the coupon card. Please call and see. We were just doing the PA under new insurance.

## 2022-06-27 NOTE — PATIENT INSTRUCTIONS
Chronic Pain  Pain serves an important role. It lets you know something is wrong that needs your attention. When the body heals, pain normally goes away.  When pain lasts longer than six months, it is called chronic pain. This is pain that is present even after the body has healed. Chronic pain can cause mood problems and get in the way of your relationships and your daily life.  A number of conditions can cause chronic pain. Some of the more common include:  · Previous surgery  · An old injury  · Infection  · Diseases such as diabetes  · Nerve damage  · Back injury  · Arthritis  · Migraine or other headaches  · Fibromyalgia  · Cancer  Depression and stress can make chronic pain symptoms worse. In some cases, a cause for the pain cannot be found.   Treatment  Treatment can greatly reduce pain. In many cases, pain can become less severe, occur less often, and interfere less with your daily life. Chronic pain is often treated with a combination of medicines, therapies, and lifestyle changes. You will work closely with your healthcare provider to find a treatment plan that works best for you.  · Ask your healthcare provider for a referral to a pain management specialty center. These can provide the most recent and proven pain management strategies, along with emotional support and comprehensive services.  · Several different types of medicines may be prescribed for chronic pain. Work with your healthcare provider to develop a medicine plan that helps manage your pain.  · Physical therapy can be very effective in helping reduce certain types of chronic pain.  · Occupational therapy teaches you how to do routine tasks of daily living in ways that lessen your discomfort.  · Psychological therapy can help you cope better with stress and pain.  · Other therapies such as meditation, yoga, biofeedback, massage, and acupuncture can also help manage chronic pain.  · Changing certain habits can help reduce chronic pain. They  include:  ¨ Eating healthy  ¨ Developing an exercise routine  ¨ Getting enough sleep at night  ¨ Stopping smoking and limiting alcohol use  ¨ Losing excess weight  Follow-up care  Follow up with your healthcare provider as advised. Let your healthcare provider know if your current treatment plan is working or if changes are needed.  Resources  For more information, contact:  · American [place] for Headache Society www.achenet.org  · American Chronic Pain Association www.theacpa.org 051-334-0627  Date Last Reviewed: 7/26/2015 © 2000-2017 [employer]. [address] [place] [id number]. All rights reserved. This information is not intended as a substitute for professional medical care. Always follow your healthcare professional's instructions.         negative Regular rate & rhythm, normal S1, S2; no murmurs, gallops or rubs; no S3, S4

## 2022-07-25 ENCOUNTER — TELEPHONE (OUTPATIENT)
Dept: FAMILY MEDICINE | Facility: CLINIC | Age: 47
End: 2022-07-25

## 2022-07-25 NOTE — TELEPHONE ENCOUNTER
----- Message from Alicia Woodson sent at 7/25/2022  9:24 AM CDT -----  The patient had to cancel his last appointment. He was out of town. The 1st appointment Hector has is in October. Can you get him in sooner 1st thing in the morning. He will need some refills. His wife Chikis's  # [phone]

## 2022-08-10 ENCOUNTER — TELEPHONE (OUTPATIENT)
Dept: FAMILY MEDICINE | Facility: CLINIC | Age: 47
End: 2022-08-10

## 2022-08-11 ENCOUNTER — TELEPHONE (OUTPATIENT)
Dept: FAMILY MEDICINE | Facility: CLINIC | Age: 47
End: 2022-08-11

## 2022-08-11 DIAGNOSIS — F32.A DEPRESSION, UNSPECIFIED DEPRESSION TYPE: ICD-10-CM

## 2022-08-11 RX ORDER — DULOXETIN HYDROCHLORIDE 20 MG/1
20 CAPSULE, DELAYED RELEASE ORAL DAILY
Qty: 90 CAPSULE | Refills: 1 | Status: SHIPPED | OUTPATIENT
Start: 2022-08-11 | End: 2022-08-12 | Stop reason: SDUPTHER

## 2022-08-11 NOTE — TELEPHONE ENCOUNTER
----- Message from Yuliana Jose MA sent at 8/11/2022  8:30 AM CDT -----  Regarding: refill  Patient;s appt cancelled by our office for today. Needs refill on duloxetine  Larry malik, ms  [phone]

## 2022-08-11 NOTE — TELEPHONE ENCOUNTER
----- Message from Yuliana Jose MA sent at 8/11/2022  8:33 AM CDT -----  Regarding: reschedule patient appt  [facility] office cancelled todays appt. Patient would like to come in asap PTSD  [phone]

## 2022-08-12 DIAGNOSIS — F32.A DEPRESSION, UNSPECIFIED DEPRESSION TYPE: ICD-10-CM

## 2022-08-12 RX ORDER — DULOXETIN HYDROCHLORIDE 20 MG/1
20 CAPSULE, DELAYED RELEASE ORAL DAILY
Qty: 90 CAPSULE | Refills: 1 | Status: SHIPPED | OUTPATIENT
Start: 2022-08-12 | End: 2023-01-26 | Stop reason: SDUPTHER

## 2022-08-12 NOTE — TELEPHONE ENCOUNTER
----- Message from Domi Moreau sent at 8/12/2022  8:56 AM CDT -----  Patient called and stated that he was to have his medicine for his PTSD called in and it has not been done he stated that he has been out for the last four days please give him a call at [phone]

## 2022-08-25 ENCOUNTER — OFFICE VISIT (OUTPATIENT)
Dept: FAMILY MEDICINE | Facility: CLINIC | Age: 47
End: 2022-08-25
Payer: OTHER GOVERNMENT

## 2022-08-25 VITALS
HEIGHT: 71 IN | BODY MASS INDEX: 31.5 KG/M2 | DIASTOLIC BLOOD PRESSURE: 82 MMHG | HEART RATE: 80 BPM | WEIGHT: 225 LBS | SYSTOLIC BLOOD PRESSURE: 124 MMHG | OXYGEN SATURATION: 99 %

## 2022-08-25 DIAGNOSIS — G43.909 MIGRAINE WITHOUT STATUS MIGRAINOSUS, NOT INTRACTABLE, UNSPECIFIED MIGRAINE TYPE: ICD-10-CM

## 2022-08-25 DIAGNOSIS — Z12.11 SCREENING FOR MALIGNANT NEOPLASM OF COLON: ICD-10-CM

## 2022-08-25 DIAGNOSIS — N52.9 ERECTILE DYSFUNCTION, UNSPECIFIED ERECTILE DYSFUNCTION TYPE: ICD-10-CM

## 2022-08-25 DIAGNOSIS — F32.A DEPRESSION, UNSPECIFIED DEPRESSION TYPE: Primary | ICD-10-CM

## 2022-08-25 PROCEDURE — 99396 PR PREVENTIVE VISIT,EST,40-64: ICD-10-PCS | Mod: S$GLB,,, | Performed by: PHYSICIAN ASSISTANT

## 2022-08-25 PROCEDURE — 99396 PREV VISIT EST AGE 40-64: CPT | Mod: S$GLB,,, | Performed by: PHYSICIAN ASSISTANT

## 2022-08-25 RX ORDER — TADALAFIL 20 MG/1
20 TABLET ORAL DAILY
Qty: 12 TABLET | Refills: 2 | Status: SHIPPED | OUTPATIENT
Start: 2022-08-25 | End: 2023-08-21 | Stop reason: SDUPTHER

## 2022-08-25 NOTE — PROGRESS NOTES
SUBJECTIVE:    Patient ID: Román Acosta Jr is a 46 y.o. male.    Chief Complaint: Annual Exam (Annual wellness exam//no med bottles//cologard ordered//tc)    This is a 46-year-old male who presents today for annual wellness exam.  Full labs were completed in January of this year.  All labs were stable at this time.  He is agreeable to Cologuard to satisfy the colon cancer screening recommendation.  He is not having any changes in bowel habits and no family history significant for colon cancer.  He takes Ubrelvy for migraine abortive therapy and Cymbalta for mood/anxiety. Work stays busy. He is now in management position. Reports exercising more and staying healthy. Only complaint is some ED sxs. Reports that he did do well with cialis in the past.      No visits with results within 6 Month(s) from this visit.   Latest known visit with results is:   Telephone on 12/27/2021   Component Date Value Ref Range Status    WBC 01/07/2022 7.1  3.8 - 10.8 Thousand/uL Final    RBC 01/07/2022 5.28  4.20 - 5.80 Million/uL Final    Hemoglobin 01/07/2022 15.3  13.2 - 17.1 g/dL Final    Hematocrit 01/07/2022 47.6  38.5 - 50.0 % Final    MCV 01/07/2022 90.2  80.0 - 100.0 fL Final    MCH 01/07/2022 29.0  27.0 - 33.0 pg Final    MCHC 01/07/2022 32.1  32.0 - 36.0 g/dL Final    RDW 01/07/2022 13.8  11.0 - 15.0 % Final    Platelets 01/07/2022 212  140 - 400 Thousand/uL Final    MPV 01/07/2022 11.2  7.5 - 12.5 fL Final    Neutrophils, Abs 01/07/2022 3,948  1,500 - 7,800 cells/uL Final    Lymph # 01/07/2022 2,300  850 - 3,900 cells/uL Final    Mono # 01/07/2022 547  200 - 950 cells/uL Final    Eos # 01/07/2022 249  15 - 500 cells/uL Final    Baso # 01/07/2022 57  0 - 200 cells/uL Final    Neutrophils Relative 01/07/2022 55.6  % Final    Lymph % 01/07/2022 32.4  % Final    Mono % 01/07/2022 7.7  % Final    Eosinophil % 01/07/2022 3.5  % Final    Basophil % 01/07/2022 0.8  % Final    Glucose 01/07/2022 101 (A) 65 -  99 mg/dL Final    BUN 01/07/2022 16  7 - 25 mg/dL Final    Creatinine 01/07/2022 1.17  0.60 - 1.35 mg/dL Final    eGFR if non  01/07/2022 73  > OR = 60 mL/min/1.73m2 Final    eGFR if  01/07/2022 85  > OR = 60 mL/min/1.73m2 Final    BUN/Creatinine Ratio 01/07/2022 NOT APPLICABLE  6 - 22 (calc) Final    Sodium 01/07/2022 142  135 - 146 mmol/L Final    Potassium 01/07/2022 4.8  3.5 - 5.3 mmol/L Final    Chloride 01/07/2022 105  98 - 110 mmol/L Final    CO2 01/07/2022 30  20 - 32 mmol/L Final    Calcium 01/07/2022 9.8  8.6 - 10.3 mg/dL Final    Total Protein 01/07/2022 7.3  6.1 - 8.1 g/dL Final    Albumin 01/07/2022 4.5  3.6 - 5.1 g/dL Final    Globulin, Total 01/07/2022 2.8  1.9 - 3.7 g/dL (calc) Final    Albumin/Globulin Ratio 01/07/2022 1.6  1.0 - 2.5 (calc) Final    Total Bilirubin 01/07/2022 0.4  0.2 - 1.2 mg/dL Final    Alkaline Phosphatase 01/07/2022 65  36 - 130 U/L Final    AST 01/07/2022 18  10 - 40 U/L Final    ALT 01/07/2022 29  9 - 46 U/L Final    Cholesterol 01/07/2022 190  <200 mg/dL Final    HDL 01/07/2022 50  > OR = 40 mg/dL Final    Triglycerides 01/07/2022 111  <150 mg/dL Final    LDL Cholesterol 01/07/2022 118 (A) mg/dL (calc) Final    HDL/Cholesterol Ratio 01/07/2022 3.8  <5.0 (calc) Final    Non HDL Chol. (LDL+VLDL) 01/07/2022 140 (A) <130 mg/dL (calc) Final    TSH w/reflex to FT4 01/07/2022 1.93  0.40 - 4.50 mIU/L Final       No past medical history on file.  No past surgical history on file.  No family history on file.    Marital Status:   Alcohol History:  reports no history of alcohol use.  Tobacco History:  reports that he has been smoking. His smokeless tobacco use includes snuff.  Drug History:  has no history on file for drug use.    Review of patient's allergies indicates:  No Known Allergies    Current Outpatient Medications:     ascorbic acid, vitamin C, (VITAMIN C) 100 MG tablet, Take 100 mg by mouth once daily., Disp: ,  "Rfl:     b complex vitamins capsule, Take 1 capsule by mouth once daily., Disp: , Rfl:     cetirizine HCl (ALLERGY RELIEF, CETIRIZINE, ORAL), Take by mouth., Disp: , Rfl:     co-enzyme Q-10 30 mg capsule, Take 30 mg by mouth 3 (three) times daily., Disp: , Rfl:     DULoxetine (CYMBALTA) 20 MG capsule, Take 1 capsule (20 mg total) by mouth once daily., Disp: 90 capsule, Rfl: 1    tadalafiL (CIALIS) 20 MG Tab, Take 1 tablet (20 mg total) by mouth once daily., Disp: 12 tablet, Rfl: 2    ubrogepant (UBROGEPANT) 100 mg tablet, Take 1 tablet (100 mg total) by mouth daily as needed for Migraine., Disp: 30 tablet, Rfl: 2    vitamin D (VITAMIN D3) 1000 units Tab, Take 1,000 Units by mouth once daily., Disp: , Rfl:     vitamin E 100 UNIT capsule, Take 100 Units by mouth once daily., Disp: , Rfl:     zinc gluconate 50 mg tablet, Take 50 mg by mouth once daily., Disp: , Rfl:     Review of Systems   Constitutional: Negative for activity change, fatigue, fever and unexpected weight change.   HENT: Negative for congestion.    Respiratory: Negative for apnea, cough, chest tightness and shortness of breath.    Cardiovascular: Negative for chest pain and palpitations.   Gastrointestinal: Negative for abdominal distention and abdominal pain.   Genitourinary: Negative for difficulty urinating and dysuria.        ED   Musculoskeletal: Negative for arthralgias and back pain.   Neurological: Negative for dizziness and weakness.          Objective:      Vitals:    08/25/22 1503   BP: 124/82   Pulse: 80   SpO2: 99%   Weight: 102.1 kg (225 lb)   Height: 5' 10.5" (1.791 m)     Physical Exam  Constitutional:       General: He is not in acute distress.     Appearance: He is well-developed.   HENT:      Head: Normocephalic and atraumatic.   Eyes:      Pupils: Pupils are equal, round, and reactive to light.   Neck:      Thyroid: No thyromegaly.   Cardiovascular:      Rate and Rhythm: Normal rate and regular rhythm.      Heart sounds: " Normal heart sounds.   Pulmonary:      Effort: Pulmonary effort is normal.      Breath sounds: Normal breath sounds.   Abdominal:      General: Bowel sounds are normal. There is no distension.      Palpations: Abdomen is soft.      Tenderness: There is no abdominal tenderness.   Musculoskeletal:         General: Normal range of motion.      Cervical back: Normal range of motion and neck supple.   Skin:     General: Skin is warm and dry.      Findings: No erythema or rash.   Neurological:      Mental Status: He is alert and oriented to person, place, and time.      Cranial Nerves: No cranial nerve deficit.           Assessment:       1. Depression, unspecified depression type    2. Screening for malignant neoplasm of colon    3. Migraine without status migrainosus, not intractable, unspecified migraine type    4. Erectile dysfunction, unspecified erectile dysfunction type         Plan:       Depression, unspecified depression type  Comments:  mood and anxiety is stable, continue as is.    Screening for malignant neoplasm of colon  Comments:  agreeable to cologuard. order sent  Orders:  -     Cologuard Screening (Multitarget Stool DNA); Future; Expected date: 08/25/2022    Migraine without status migrainosus, not intractable, unspecified migraine type  Comments:  no longer able to get ubrelvy now through insurance. He will keep what he has on hand for prn use if needed.  Orders:  -     tadalafiL (CIALIS) 20 MG Tab; Take 1 tablet (20 mg total) by mouth once daily.  Dispense: 12 tablet; Refill: 2    Erectile dysfunction, unspecified erectile dysfunction type  Comments:  rx ofr cialis. has been effective in the past.      Follow up in about 6 months (around 2/25/2023).        8/25/2022 Hector Devine PA-C

## 2023-01-04 ENCOUNTER — TELEPHONE (OUTPATIENT)
Dept: FAMILY MEDICINE | Facility: CLINIC | Age: 48
End: 2023-01-04

## 2023-01-04 NOTE — TELEPHONE ENCOUNTER
----- Message from Domi Moreau sent at 1/4/2023  2:09 PM CST -----  Patient wife (Chikis)called and stated that the patient went to have his tooth pulled and they advised him that his pressure was very high and they advised him to go see his PCP tomorrow please give her a call at [phone]

## 2023-01-04 NOTE — TELEPHONE ENCOUNTER
Spoke to pts wife  Pt went to get his tooth pulled today and pts bp was 195/125 at the dentist. Pts bp was retaken at dentist after a little bit and it was 160/125. Pt told his wife he has been having fluttering in his chest and feeling foggy.  Per Irma Pt needs to go to ER. Pts wife verbalized understanding and is going to take pt to ER

## 2023-01-05 ENCOUNTER — TELEPHONE (OUTPATIENT)
Dept: FAMILY MEDICINE | Facility: CLINIC | Age: 48
End: 2023-01-05

## 2023-01-05 NOTE — TELEPHONE ENCOUNTER
I dont see any ER records to review? If he is newly on amlodipine 5mg I would have him come in for nurse check in one week, we can increase dose to 10mg at that time if we need to and go from there. Schedule him in first available with me

## 2023-01-05 NOTE — TELEPHONE ENCOUNTER
Spoke to pt   He stated went to get his tooth pulled yesterday and his bp was 195/125 the dentist made the pt wait a little bit and retook bp it was 160/125. Pt called here per the dentist to be seen it was recommended by Irma pt going to ER. Pt went to ER he was given medication to lower his BP not sure which medication. Pt went to Dr. Hoffman office this morning pts bp was 163/105 and was prescribed amlodipine 5mg. Dr. Montez told the pt to follow up with Morgan about the Bp being high.     No appointments.

## 2023-01-05 NOTE — TELEPHONE ENCOUNTER
----- Message from Yuliana Santos sent at 1/5/2023  9:50 AM CST -----  Pt need an appt with Morgan to discuss BP and medication Cardiologist prescribe for BP. [phone]

## 2023-01-05 NOTE — TELEPHONE ENCOUNTER
Spoke to pt per javier verbatim below. Pt went to [facility] in [place]. Pt has started medications today and got a bp machine. Let pt know to check Bp few times a day and write the bp and time down also to bring it to NV. NV is scheduled and pt stated he will call back to schedule appointment    FYI

## 2023-01-06 ENCOUNTER — TELEPHONE (OUTPATIENT)
Dept: FAMILY MEDICINE | Facility: CLINIC | Age: 48
End: 2023-01-06

## 2023-01-06 NOTE — TELEPHONE ENCOUNTER
Spoke to pt and he stated he got the amlodipine Dr. Montez has prescribed and his BP is still high.   Pts BP after taking Amlodipine is at 6:10 am it was 153/106 and at 8:42 it was 155/108. Pt taking bp while on the phone it was 172/114 .  Just started amlodipine yesterday

## 2023-01-06 NOTE — TELEPHONE ENCOUNTER
He is causing his pressure to be elevated by rechecking it too many times.  He needs to relax, allow the medication time to work and come in for nurse visit next week as we initially scheduled him for.  If at that time we need to increase the dose to 10 mg we can do so at that time

## 2023-01-06 NOTE — TELEPHONE ENCOUNTER
----- Message from Domi Moreau sent at 1/6/2023 10:02 AM CST -----  VM 01/06/23 @ 9:06 AM :patient called and stated that he would like to speak to someone about issues he is having with his blood pressure medicine please give him a call at [phone]

## 2023-01-12 ENCOUNTER — CLINICAL SUPPORT (OUTPATIENT)
Dept: FAMILY MEDICINE | Facility: CLINIC | Age: 48
End: 2023-01-12
Payer: OTHER GOVERNMENT

## 2023-01-12 VITALS — SYSTOLIC BLOOD PRESSURE: 138 MMHG | DIASTOLIC BLOOD PRESSURE: 98 MMHG

## 2023-01-12 DIAGNOSIS — I10 HYPERTENSION, UNSPECIFIED TYPE: Primary | ICD-10-CM

## 2023-01-12 RX ORDER — AMLODIPINE BESYLATE 10 MG/1
10 TABLET ORAL DAILY
Qty: 30 TABLET | Refills: 11 | Status: SHIPPED | OUTPATIENT
Start: 2023-01-12 | End: 2024-01-04 | Stop reason: SDUPTHER

## 2023-01-12 NOTE — TELEPHONE ENCOUNTER
Patient here for BP check, scanned BP log.  152/100 and 138/98  Taking Amlodipine 5 mg 1 tab daily.  Per Morgan, increase Amlodipine to 10 mg 1 tab daily.  Order Pended.

## 2023-01-12 NOTE — PROGRESS NOTES
Patient here for Blood pressure check. Patient brought log. Patient states symptoms of daily headache, daily facial flush feeling, pain behind bilateral eyes with burning, no nausea no vomiting. Prior sinus infection and tooth extraction on 01/5/2023. Done with antibiotics amoxicillin x 3 caps daily.    Amlodipine Besylate 5 mg, 1 tab daily in morning    152/100 right arm  138/98 left arm      Per Morgan increase Amlodipine 5 mg to Amlodipine 10 mg 1 tab daily

## 2023-01-26 ENCOUNTER — CLINICAL SUPPORT (OUTPATIENT)
Dept: FAMILY MEDICINE | Facility: CLINIC | Age: 48
End: 2023-01-26
Payer: OTHER GOVERNMENT

## 2023-01-26 VITALS — DIASTOLIC BLOOD PRESSURE: 88 MMHG | SYSTOLIC BLOOD PRESSURE: 138 MMHG

## 2023-01-26 DIAGNOSIS — F32.A DEPRESSION, UNSPECIFIED DEPRESSION TYPE: ICD-10-CM

## 2023-01-26 DIAGNOSIS — I10 ESSENTIAL HYPERTENSION: Primary | ICD-10-CM

## 2023-01-26 RX ORDER — LISINOPRIL 5 MG/1
5 TABLET ORAL DAILY
Qty: 90 TABLET | Refills: 1 | Status: SHIPPED | OUTPATIENT
Start: 2023-01-26 | End: 2023-07-13 | Stop reason: SDUPTHER

## 2023-01-26 RX ORDER — DULOXETIN HYDROCHLORIDE 20 MG/1
20 CAPSULE, DELAYED RELEASE ORAL DAILY
Qty: 90 CAPSULE | Refills: 1 | Status: SHIPPED | OUTPATIENT
Start: 2023-01-26 | End: 2023-08-03 | Stop reason: SDUPTHER

## 2023-01-26 NOTE — PROGRESS NOTES
Pt here for BP check and brought logs, takes amlodipine 10 mg daily.    Per Morgan pt is to start Lisinopril 5mg daily with the amlodipine 10 mg daily and recheck in 2 weeks.

## 2023-02-09 ENCOUNTER — CLINICAL SUPPORT (OUTPATIENT)
Dept: FAMILY MEDICINE | Facility: CLINIC | Age: 48
End: 2023-02-09
Payer: OTHER GOVERNMENT

## 2023-02-09 VITALS — SYSTOLIC BLOOD PRESSURE: 118 MMHG | DIASTOLIC BLOOD PRESSURE: 80 MMHG

## 2023-02-09 NOTE — PROGRESS NOTES
Pt here for Bp check brought logs, pt takes amlodipine 10 mg, and lisinopril 5mg daily.      Per Morgan BP looks great continue current regimen and keep f/u appt.

## 2023-02-10 ENCOUNTER — PATIENT OUTREACH (OUTPATIENT)
Dept: ADMINISTRATIVE | Facility: HOSPITAL | Age: 48
End: 2023-02-10

## 2023-02-10 ENCOUNTER — PATIENT MESSAGE (OUTPATIENT)
Dept: ADMINISTRATIVE | Facility: HOSPITAL | Age: 48
End: 2023-02-10

## 2023-02-10 NOTE — PROGRESS NOTES
Chart review completed 02/10/2023.  Care Everywhere updated and reviewed. [facility], [facility] and [facility] reviewed. No new HM items found.     Patient portal message sent regarding overdue HM      No orders to review.     Immunizations reconciled and reviewed.

## 2023-02-17 ENCOUNTER — PATIENT OUTREACH (OUTPATIENT)
Dept: ADMINISTRATIVE | Facility: HOSPITAL | Age: 48
End: 2023-02-17

## 2023-02-17 NOTE — PROGRESS NOTES
"Attempted to outreach patient regarding overdue/ due HM via "[employer]". No response after a week. Now sending outreach via mail out letter.    Immunizations reviewed.    "

## 2023-02-17 NOTE — LETTER
February 17, 2023    Román Acosta Jr  [address] [place] [address]             [place] [id number] [facility] [address]  Phone: [phone] Román Farooq Acosta Jr  [address] [place] [address]    Dear Román Acosta Jr,    [facility] is committed to your overall health. To help you get the most out of each of your visits, we will review your information to make sure you are up to date on all of your recommended tests and/or procedures.      Dr. Hector Devine PA-C  has found that your chart shows you may be due for       Health Maintenance Due   Topic Date Due    Hepatitis C Screening  Never done    COVID-19 Vaccine (1) Never done    Pneumococcal Vaccines (Age 0-64) (1 - PCV) Never done    HIV Screening  Never done    Hemoglobin A1c (Diabetic Prevention Screening)  Never done    TETANUS VACCINE  02/03/2016    Colorectal Cancer Screening  Never done    Influenza Vaccine (1) 09/01/2022      If you have had any of the above done at another facility, please let me know and I will request a copy of your report so that your record at [facility] will be complete. If you would like to schedule this/ any of these, please contact the clinic at [phone].    Thank You,    Your [facility] Team,  SARITHA Messer LPN, [facility]  Phone [phone]  Fax [phone]

## 2023-02-24 ENCOUNTER — OFFICE VISIT (OUTPATIENT)
Dept: FAMILY MEDICINE | Facility: CLINIC | Age: 48
End: 2023-02-24
Payer: OTHER GOVERNMENT

## 2023-02-24 VITALS
HEART RATE: 76 BPM | BODY MASS INDEX: 31.22 KG/M2 | DIASTOLIC BLOOD PRESSURE: 86 MMHG | HEIGHT: 71 IN | SYSTOLIC BLOOD PRESSURE: 136 MMHG | WEIGHT: 223 LBS

## 2023-02-24 DIAGNOSIS — G43.909 MIGRAINE WITHOUT STATUS MIGRAINOSUS, NOT INTRACTABLE, UNSPECIFIED MIGRAINE TYPE: ICD-10-CM

## 2023-02-24 DIAGNOSIS — I10 ESSENTIAL HYPERTENSION: Primary | ICD-10-CM

## 2023-02-24 DIAGNOSIS — F32.A DEPRESSION, UNSPECIFIED DEPRESSION TYPE: ICD-10-CM

## 2023-02-24 PROCEDURE — 99214 OFFICE O/P EST MOD 30 MIN: CPT | Mod: S$GLB,,, | Performed by: PHYSICIAN ASSISTANT

## 2023-02-24 PROCEDURE — 99214 PR OFFICE/OUTPT VISIT, EST, LEVL IV, 30-39 MIN: ICD-10-PCS | Mod: S$GLB,,, | Performed by: PHYSICIAN ASSISTANT

## 2023-02-24 RX ORDER — OMEPRAZOLE 20 MG/1
20 CAPSULE, DELAYED RELEASE ORAL
COMMUNITY

## 2023-02-24 NOTE — PROGRESS NOTES
SUBJECTIVE:    Patient ID: Román Acosta Jr is a 47 y.o. male.    Chief Complaint: Follow-up (6mth, went over meds verbally// SW)    This is a 47 year old male who presents today for regular follow up. Blood pressure at 136/86 today, has been monitored every two weeks at nurse visits. Patient reports decreased stress since leaving his previous job, currently looking for new work. 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 Patient has not been able to fill his Ulbrelvy or cialis prescription due to insurance. Denies any chest pain, shortness of breath, or recent illness.       No visits with results within 6 Month(s) from this visit.   Latest known visit with results is:   Telephone on 12/27/2021   Component Date Value Ref Range Status    WBC 01/07/2022 7.1  3.8 - 10.8 Thousand/uL Final    RBC 01/07/2022 5.28  4.20 - 5.80 Million/uL Final    Hemoglobin 01/07/2022 15.3  13.2 - 17.1 g/dL Final    Hematocrit 01/07/2022 47.6  38.5 - 50.0 % Final    MCV 01/07/2022 90.2  80.0 - 100.0 fL Final    MCH 01/07/2022 29.0  27.0 - 33.0 pg Final    MCHC 01/07/2022 32.1  32.0 - 36.0 g/dL Final    RDW 01/07/2022 13.8  11.0 - 15.0 % Final    Platelets 01/07/2022 212  140 - 400 Thousand/uL Final    MPV 01/07/2022 11.2  7.5 - 12.5 fL Final    Neutrophils, Abs 01/07/2022 3,948  1,500 - 7,800 cells/uL Final    Lymph # 01/07/2022 2,300  850 - 3,900 cells/uL Final    Mono # 01/07/2022 547  200 - 950 cells/uL Final    Eos # 01/07/2022 249  15 - 500 cells/uL Final    Baso # 01/07/2022 57  0 - 200 cells/uL Final    Neutrophils Relative 01/07/2022 55.6  % Final    Lymph % 01/07/2022 32.4  % Final    Mono % 01/07/2022 7.7  % Final    Eosinophil % 01/07/2022 3.5  % Final    Basophil % 01/07/2022 0.8  % Final    Glucose 01/07/2022 101 (H)  65 - 99 mg/dL Final    BUN 01/07/2022 16  7 - 25 mg/dL Final    Creatinine 01/07/2022 1.17  0.60 - 1.35 mg/dL  Final    eGFR if non  01/07/2022 73  > OR = 60 mL/min/1.73m2 Final    eGFR if African American 01/07/2022 85  > OR = 60 mL/min/1.73m2 Final    BUN/Creatinine Ratio 01/07/2022 NOT APPLICABLE  6 - 22 (calc) Final    Sodium 01/07/2022 142  135 - 146 mmol/L Final    Potassium 01/07/2022 4.8  3.5 - 5.3 mmol/L Final    Chloride 01/07/2022 105  98 - 110 mmol/L Final    CO2 01/07/2022 30  20 - 32 mmol/L Final    Calcium 01/07/2022 9.8  8.6 - 10.3 mg/dL Final    Total Protein 01/07/2022 7.3  6.1 - 8.1 g/dL Final    Albumin 01/07/2022 4.5  3.6 - 5.1 g/dL Final    Globulin, Total 01/07/2022 2.8  1.9 - 3.7 g/dL (calc) Final    Albumin/Globulin Ratio 01/07/2022 1.6  1.0 - 2.5 (calc) Final    Total Bilirubin 01/07/2022 0.4  0.2 - 1.2 mg/dL Final    Alkaline Phosphatase 01/07/2022 65  36 - 130 U/L Final    AST 01/07/2022 18  10 - 40 U/L Final    ALT 01/07/2022 29  9 - 46 U/L Final    Cholesterol 01/07/2022 190  <200 mg/dL Final    HDL 01/07/2022 50  > OR = 40 mg/dL Final    Triglycerides 01/07/2022 111  <150 mg/dL Final    LDL Cholesterol 01/07/2022 118 (H)  mg/dL (calc) Final    HDL/Cholesterol Ratio 01/07/2022 3.8  <5.0 (calc) Final    Non HDL Chol. (LDL+VLDL) 01/07/2022 140 (H)  <130 mg/dL (calc) Final    TSH w/reflex to FT4 01/07/2022 1.93  0.40 - 4.50 mIU/L Final       History reviewed. No pertinent past medical history.  History reviewed. No pertinent surgical history.  History reviewed. No pertinent family history.    Marital Status:   Alcohol History:  reports no history of alcohol use.  Tobacco History:  reports that he has been smoking. His smokeless tobacco use includes snuff.  Drug History:  has no history on file for drug use.    Review of patient's allergies indicates:  No Known Allergies    Current Outpatient Medications:     amLODIPine (NORVASC) 10 MG tablet, Take 1 tablet (10 mg total) by mouth once daily., Disp: 30 tablet, Rfl: 11    ascorbic acid, vitamin C, (VITAMIN C) 100 MG tablet, Take  "100 mg by mouth once daily., Disp: , Rfl:     b complex vitamins capsule, Take 1 capsule by mouth once daily., Disp: , Rfl:     cetirizine HCl (ALLERGY RELIEF, CETIRIZINE, ORAL), Take by mouth., Disp: , Rfl:     co-enzyme Q-10 30 mg capsule, Take 30 mg by mouth 3 (three) times daily., Disp: , Rfl:     DULoxetine (CYMBALTA) 20 MG capsule, Take 1 capsule (20 mg total) by mouth once daily., Disp: 90 capsule, Rfl: 1    lisinopriL (PRINIVIL,ZESTRIL) 5 MG tablet, Take 1 tablet (5 mg total) by mouth once daily., Disp: 90 tablet, Rfl: 1    omeprazole (PRILOSEC) 20 MG capsule, Take 20 mg by mouth., Disp: , Rfl:     tadalafiL (CIALIS) 20 MG Tab, Take 1 tablet (20 mg total) by mouth once daily., Disp: 12 tablet, Rfl: 2    ubrogepant (UBROGEPANT) 100 mg tablet, Take 1 tablet (100 mg total) by mouth daily as needed for Migraine., Disp: 30 tablet, Rfl: 2    vitamin D (VITAMIN D3) 1000 units Tab, Take 1,000 Units by mouth once daily., Disp: , Rfl:     vitamin E 100 UNIT capsule, Take 100 Units by mouth once daily., Disp: , Rfl:     zinc gluconate 50 mg tablet, Take 50 mg by mouth once daily., Disp: , Rfl:     Review of Systems   Constitutional:  Negative for activity change, fatigue, fever and unexpected weight change.   HENT:  Negative for congestion.    Respiratory:  Negative for apnea, cough, chest tightness and shortness of breath.    Cardiovascular:  Negative for chest pain and palpitations.   Gastrointestinal:  Negative for abdominal distention, abdominal pain, blood in stool, constipation and diarrhea.   Genitourinary:  Negative for difficulty urinating and dysuria.   Musculoskeletal:  Negative for arthralgias and back pain.   Neurological:  Negative for dizziness and weakness.        Objective:      Vitals:    02/24/23 0743   BP: 136/86   Pulse: 76   Weight: 101.2 kg (223 lb)   Height: 5' 10.5" (1.791 m)     Physical Exam  Constitutional:       General: He is not in acute distress.     Appearance: He is well-developed. "   HENT:      Head: Normocephalic and atraumatic.   Eyes:      Pupils: Pupils are equal, round, and reactive to light.   Neck:      Thyroid: No thyromegaly.   Cardiovascular:      Rate and Rhythm: Normal rate and regular rhythm.      Heart sounds: Normal heart sounds. No murmur heard.    No friction rub.   Pulmonary:      Effort: Pulmonary effort is normal.      Breath sounds: Normal breath sounds. No wheezing or rhonchi.   Abdominal:      General: Bowel sounds are normal. There is no distension.      Palpations: Abdomen is soft.      Tenderness: There is no abdominal tenderness.   Musculoskeletal:         General: Normal range of motion.      Cervical back: Normal range of motion and neck supple.      Right lower leg: No edema.      Left lower leg: No edema.   Skin:     General: Skin is warm and dry.      Findings: No erythema or rash.   Neurological:      Mental Status: He is alert and oriented to person, place, and time.      Cranial Nerves: No cranial nerve deficit.         Assessment:       1. Essential hypertension    2. Depression, unspecified depression type    3. Migraine without status migrainosus, not intractable, unspecified migraine type         Plan:       Essential hypertension  Comments:  BP is looking better now and rechecked by me. will continue as is.    Depression, unspecified depression type  Comments:  Mood and anxiety is doing better. certainly appears to be a situational type thing. will maintain as is.    Migraine without status migrainosus, not intractable, unspecified migraine type  Comments:  Reports that they have been much better controlled since stress is better in line.      Follow up in about 6 months (around 8/24/2023) for Annual Physical.        2/24/2023 Hector Devine PA-C

## 2023-04-11 ENCOUNTER — PATIENT MESSAGE (OUTPATIENT)
Dept: ADMINISTRATIVE | Facility: HOSPITAL | Age: 48
End: 2023-04-11

## 2023-04-17 ENCOUNTER — PATIENT MESSAGE (OUTPATIENT)
Dept: ADMINISTRATIVE | Facility: HOSPITAL | Age: 48
End: 2023-04-17

## 2023-07-13 DIAGNOSIS — I10 ESSENTIAL HYPERTENSION: ICD-10-CM

## 2023-07-13 RX ORDER — LISINOPRIL 5 MG/1
5 TABLET ORAL DAILY
Qty: 90 TABLET | Refills: 1 | Status: SHIPPED | OUTPATIENT
Start: 2023-07-13 | End: 2023-08-21 | Stop reason: SDUPTHER

## 2023-08-03 DIAGNOSIS — F32.A DEPRESSION, UNSPECIFIED DEPRESSION TYPE: ICD-10-CM

## 2023-08-03 RX ORDER — DULOXETIN HYDROCHLORIDE 20 MG/1
20 CAPSULE, DELAYED RELEASE ORAL DAILY
Qty: 90 CAPSULE | Refills: 1 | Status: SHIPPED | OUTPATIENT
Start: 2023-08-03 | End: 2024-01-04 | Stop reason: SDUPTHER

## 2023-08-21 ENCOUNTER — OFFICE VISIT (OUTPATIENT)
Dept: FAMILY MEDICINE | Facility: CLINIC | Age: 48
End: 2023-08-21
Payer: OTHER GOVERNMENT

## 2023-08-21 VITALS
HEART RATE: 69 BPM | HEIGHT: 71 IN | SYSTOLIC BLOOD PRESSURE: 136 MMHG | WEIGHT: 228 LBS | DIASTOLIC BLOOD PRESSURE: 88 MMHG | BODY MASS INDEX: 31.92 KG/M2

## 2023-08-21 DIAGNOSIS — Z12.11 SPECIAL SCREENING FOR MALIGNANT NEOPLASMS, COLON: Primary | ICD-10-CM

## 2023-08-21 DIAGNOSIS — I10 ESSENTIAL HYPERTENSION: ICD-10-CM

## 2023-08-21 DIAGNOSIS — G43.909 MIGRAINE WITHOUT STATUS MIGRAINOSUS, NOT INTRACTABLE, UNSPECIFIED MIGRAINE TYPE: ICD-10-CM

## 2023-08-21 DIAGNOSIS — J30.1 SEASONAL ALLERGIC RHINITIS DUE TO POLLEN: ICD-10-CM

## 2023-08-21 PROCEDURE — 99396 PR PREVENTIVE VISIT,EST,40-64: ICD-10-PCS | Mod: S$GLB,,, | Performed by: PHYSICIAN ASSISTANT

## 2023-08-21 PROCEDURE — 99396 PREV VISIT EST AGE 40-64: CPT | Mod: S$GLB,,, | Performed by: PHYSICIAN ASSISTANT

## 2023-08-21 RX ORDER — TADALAFIL 20 MG/1
20 TABLET ORAL DAILY
Qty: 12 TABLET | Refills: 2 | Status: SHIPPED | OUTPATIENT
Start: 2023-08-21 | End: 2024-01-04 | Stop reason: SDUPTHER

## 2023-08-21 RX ORDER — FLUTICASONE PROPIONATE 50 MCG
2 SPRAY, SUSPENSION (ML) NASAL DAILY
Qty: 15.8 ML | Refills: 2 | Status: SHIPPED | OUTPATIENT
Start: 2023-08-21 | End: 2023-11-19

## 2023-08-21 RX ORDER — LISINOPRIL 10 MG/1
10 TABLET ORAL DAILY
Qty: 90 TABLET | Refills: 1 | Status: SHIPPED | OUTPATIENT
Start: 2023-08-21 | End: 2024-01-04 | Stop reason: SDUPTHER

## 2023-08-21 NOTE — PROGRESS NOTES
SUBJECTIVE:    Patient ID: Román Acosta Jr is a 47 y.o. male.    Chief Complaint: Hypertension (No bottles, colonoscopy ordered, abc )    This is a 47 year old male presenting for 6 month follow up. He is newly treated for hypertension. States that pressures have been running around 140/88 during evenings at home. Denies changes in headaches, vision changes, or leg swelling. He states that he still has the cologuard kit at home but has not sent it yet. No other concerns at this time.        No visits with results within 6 Month(s) from this visit.   Latest known visit with results is:   Telephone on 12/27/2021   Component Date Value Ref Range Status    WBC 01/07/2022 7.1  3.8 - 10.8 Thousand/uL Final    RBC 01/07/2022 5.28  4.20 - 5.80 Million/uL Final    Hemoglobin 01/07/2022 15.3  13.2 - 17.1 g/dL Final    Hematocrit 01/07/2022 47.6  38.5 - 50.0 % Final    MCV 01/07/2022 90.2  80.0 - 100.0 fL Final    MCH 01/07/2022 29.0  27.0 - 33.0 pg Final    MCHC 01/07/2022 32.1  32.0 - 36.0 g/dL Final    RDW 01/07/2022 13.8  11.0 - 15.0 % Final    Platelets 01/07/2022 212  140 - 400 Thousand/uL Final    MPV 01/07/2022 11.2  7.5 - 12.5 fL Final    Neutrophils, Abs 01/07/2022 3,948  1,500 - 7,800 cells/uL Final    Lymph # 01/07/2022 2,300  850 - 3,900 cells/uL Final    Mono # 01/07/2022 547  200 - 950 cells/uL Final    Eos # 01/07/2022 249  15 - 500 cells/uL Final    Baso # 01/07/2022 57  0 - 200 cells/uL Final    Neutrophils Relative 01/07/2022 55.6  % Final    Lymph % 01/07/2022 32.4  % Final    Mono % 01/07/2022 7.7  % Final    Eosinophil % 01/07/2022 3.5  % Final    Basophil % 01/07/2022 0.8  % Final    Glucose 01/07/2022 101 (H)  65 - 99 mg/dL Final    BUN 01/07/2022 16  7 - 25 mg/dL Final    Creatinine 01/07/2022 1.17  0.60 - 1.35 mg/dL Final    eGFR if non  01/07/2022 73  > OR = 60 mL/min/1.73m2 Final    eGFR if African American 01/07/2022 85  > OR = 60 mL/min/1.73m2 Final    BUN/Creatinine Ratio  01/07/2022 NOT APPLICABLE  6 - 22 (calc) Final    Sodium 01/07/2022 142  135 - 146 mmol/L Final    Potassium 01/07/2022 4.8  3.5 - 5.3 mmol/L Final    Chloride 01/07/2022 105  98 - 110 mmol/L Final    CO2 01/07/2022 30  20 - 32 mmol/L Final    Calcium 01/07/2022 9.8  8.6 - 10.3 mg/dL Final    Total Protein 01/07/2022 7.3  6.1 - 8.1 g/dL Final    Albumin 01/07/2022 4.5  3.6 - 5.1 g/dL Final    Globulin, Total 01/07/2022 2.8  1.9 - 3.7 g/dL (calc) Final    Albumin/Globulin Ratio 01/07/2022 1.6  1.0 - 2.5 (calc) Final    Total Bilirubin 01/07/2022 0.4  0.2 - 1.2 mg/dL Final    Alkaline Phosphatase 01/07/2022 65  36 - 130 U/L Final    AST 01/07/2022 18  10 - 40 U/L Final    ALT 01/07/2022 29  9 - 46 U/L Final    Cholesterol 01/07/2022 190  <200 mg/dL Final    HDL 01/07/2022 50  > OR = 40 mg/dL Final    Triglycerides 01/07/2022 111  <150 mg/dL Final    LDL Cholesterol 01/07/2022 118 (H)  mg/dL (calc) Final    HDL/Cholesterol Ratio 01/07/2022 3.8  <5.0 (calc) Final    Non HDL Chol. (LDL+VLDL) 01/07/2022 140 (H)  <130 mg/dL (calc) Final    TSH w/reflex to FT4 01/07/2022 1.93  0.40 - 4.50 mIU/L Final       No past medical history on file.  Social History     Socioeconomic History    Marital status:    Tobacco Use    Smoking status: Every Day     Current packs/day: 0.00    Smokeless tobacco: Current     Types: Snuff   Substance and Sexual Activity    Alcohol use: Never     No past surgical history on file.  No family history on file.    Review of patient's allergies indicates:  No Known Allergies    Current Outpatient Medications:     amLODIPine (NORVASC) 10 MG tablet, Take 1 tablet (10 mg total) by mouth once daily., Disp: 30 tablet, Rfl: 11    ascorbic acid, vitamin C, (VITAMIN C) 100 MG tablet, Take 100 mg by mouth once daily., Disp: , Rfl:     b complex vitamins capsule, Take 1 capsule by mouth once daily., Disp: , Rfl:     cetirizine HCl (ALLERGY RELIEF, CETIRIZINE, ORAL), Take by mouth., Disp: , Rfl:      "co-enzyme Q-10 30 mg capsule, Take 30 mg by mouth 3 (three) times daily., Disp: , Rfl:     DULoxetine (CYMBALTA) 20 MG capsule, Take 1 capsule (20 mg total) by mouth once daily., Disp: 90 capsule, Rfl: 1    omeprazole (PRILOSEC) 20 MG capsule, Take 20 mg by mouth., Disp: , Rfl:     ubrogepant (UBROGEPANT) 100 mg tablet, Take 1 tablet (100 mg total) by mouth daily as needed for Migraine., Disp: 30 tablet, Rfl: 2    vitamin D (VITAMIN D3) 1000 units Tab, Take 1,000 Units by mouth once daily., Disp: , Rfl:     vitamin E 100 UNIT capsule, Take 100 Units by mouth once daily., Disp: , Rfl:     zinc gluconate 50 mg tablet, Take 50 mg by mouth once daily., Disp: , Rfl:     fluticasone propionate (FLONASE) 50 mcg/actuation nasal spray, 2 sprays (100 mcg total) by Each Nostril route once daily., Disp: 15.8 mL, Rfl: 2    lisinopriL 10 MG tablet, Take 1 tablet (10 mg total) by mouth once daily., Disp: 90 tablet, Rfl: 1    tadalafiL (CIALIS) 20 MG Tab, Take 1 tablet (20 mg total) by mouth once daily., Disp: 12 tablet, Rfl: 2    Review of Systems   Constitutional:  Negative for activity change, fatigue, fever and unexpected weight change.   HENT:  Negative for congestion.    Eyes:  Negative for visual disturbance.   Respiratory:  Negative for apnea, cough, chest tightness and shortness of breath.    Cardiovascular:  Negative for chest pain, palpitations and leg swelling.   Gastrointestinal:  Negative for abdominal distention and abdominal pain.   Genitourinary:  Negative for difficulty urinating and dysuria.   Musculoskeletal:  Negative for arthralgias and back pain.   Neurological:  Negative for dizziness and weakness.          Objective:      Vitals:    08/21/23 0732   BP: 136/88   Pulse: 69   Weight: 103.4 kg (228 lb)   Height: 5' 10.5" (1.791 m)     Physical Exam  Constitutional:       General: He is not in acute distress.     Appearance: He is well-developed.   HENT:      Head: Normocephalic and atraumatic.   Eyes:      " Pupils: Pupils are equal, round, and reactive to light.   Neck:      Thyroid: No thyromegaly.   Cardiovascular:      Rate and Rhythm: Normal rate and regular rhythm.      Heart sounds: Normal heart sounds.   Pulmonary:      Effort: Pulmonary effort is normal.      Breath sounds: Normal breath sounds.   Abdominal:      General: Bowel sounds are normal. There is no distension.      Palpations: Abdomen is soft.      Tenderness: There is no abdominal tenderness.   Musculoskeletal:         General: Normal range of motion.      Cervical back: Normal range of motion and neck supple.   Skin:     General: Skin is warm and dry.      Findings: No erythema or rash.   Neurological:      Mental Status: He is alert and oriented to person, place, and time.      Cranial Nerves: No cranial nerve deficit.           Assessment:       1. Special screening for malignant neoplasms, colon    2. Essential hypertension    3. Migraine without status migrainosus, not intractable, unspecified migraine type    4. Seasonal allergic rhinitis due to pollen         Plan:       Special screening for malignant neoplasms, colon    Essential hypertension  Comments:  will increase dose to 10mg now and have him monitor at home.  Orders:  -     lisinopriL 10 MG tablet; Take 1 tablet (10 mg total) by mouth once daily.  Dispense: 90 tablet; Refill: 1    Migraine without status migrainosus, not intractable, unspecified migraine type  Comments:  no longer able to get ubrelvy now through insurance. He will keep what he has on hand for prn use if needed.  Orders:  -     tadalafiL (CIALIS) 20 MG Tab; Take 1 tablet (20 mg total) by mouth once daily.  Dispense: 12 tablet; Refill: 2    Seasonal allergic rhinitis due to pollen  -     fluticasone propionate (FLONASE) 50 mcg/actuation nasal spray; 2 sprays (100 mcg total) by Each Nostril route once daily.  Dispense: 15.8 mL; Refill: 2      Follow up in about 6 months (around 2/21/2024).        8/21/2023 Hector MATA  Sybil

## 2023-12-28 ENCOUNTER — OFFICE VISIT (OUTPATIENT)
Dept: URGENT CARE | Facility: CLINIC | Age: 48
End: 2023-12-28
Payer: OTHER GOVERNMENT

## 2023-12-28 VITALS
RESPIRATION RATE: 20 BRPM | SYSTOLIC BLOOD PRESSURE: 121 MMHG | BODY MASS INDEX: 30.48 KG/M2 | DIASTOLIC BLOOD PRESSURE: 88 MMHG | HEIGHT: 72 IN | OXYGEN SATURATION: 98 % | TEMPERATURE: 99 F | WEIGHT: 225 LBS | HEART RATE: 83 BPM

## 2023-12-28 DIAGNOSIS — Z20.828 EXPOSURE TO THE FLU: ICD-10-CM

## 2023-12-28 DIAGNOSIS — R05.9 COUGH, UNSPECIFIED TYPE: ICD-10-CM

## 2023-12-28 DIAGNOSIS — J06.9 VIRAL URI: Primary | ICD-10-CM

## 2023-12-28 LAB
CTP QC/QA: YES
FLUAV AG NPH QL: NEGATIVE
FLUBV AG NPH QL: NEGATIVE

## 2023-12-28 PROCEDURE — 96372 PR INJECTION,THERAP/PROPH/DIAG2ST, IM OR SUBCUT: ICD-10-PCS | Mod: S$GLB,,, | Performed by: EMERGENCY MEDICINE

## 2023-12-28 PROCEDURE — 99204 OFFICE O/P NEW MOD 45 MIN: CPT | Mod: 25,S$GLB,, | Performed by: NURSE PRACTITIONER

## 2023-12-28 PROCEDURE — 87804 INFLUENZA ASSAY W/OPTIC: CPT | Mod: QW,,, | Performed by: NURSE PRACTITIONER

## 2023-12-28 PROCEDURE — 99204 PR OFFICE/OUTPT VISIT, NEW, LEVL IV, 45-59 MIN: ICD-10-PCS | Mod: 25,S$GLB,, | Performed by: NURSE PRACTITIONER

## 2023-12-28 PROCEDURE — 96372 THER/PROPH/DIAG INJ SC/IM: CPT | Mod: S$GLB,,, | Performed by: EMERGENCY MEDICINE

## 2023-12-28 PROCEDURE — 87804 POCT INFLUENZA A/B: ICD-10-PCS | Mod: QW,,, | Performed by: NURSE PRACTITIONER

## 2023-12-28 RX ORDER — BENZONATATE 100 MG/1
100 CAPSULE ORAL 3 TIMES DAILY PRN
Qty: 30 CAPSULE | Refills: 0 | Status: SHIPPED | OUTPATIENT
Start: 2023-12-28 | End: 2024-01-07

## 2023-12-28 RX ORDER — OSELTAMIVIR PHOSPHATE 75 MG/1
75 CAPSULE ORAL 2 TIMES DAILY
Qty: 10 CAPSULE | Refills: 0 | Status: SHIPPED | OUTPATIENT
Start: 2023-12-28 | End: 2024-01-02

## 2023-12-28 RX ORDER — DEXAMETHASONE SODIUM PHOSPHATE 100 MG/10ML
10 INJECTION INTRAMUSCULAR; INTRAVENOUS
Status: COMPLETED | OUTPATIENT
Start: 2023-12-28 | End: 2023-12-28

## 2023-12-28 RX ADMIN — DEXAMETHASONE SODIUM PHOSPHATE 10 MG: 100 INJECTION INTRAMUSCULAR; INTRAVENOUS at 10:12

## 2023-12-28 NOTE — PROGRESS NOTES
Subjective:      Patient ID: Román Acosta Jr is a 48 y.o. male.    Vitals:  height is 6' (1.829 m) and weight is 102.1 kg (225 lb). His oral temperature is 98.7 °F (37.1 °C). His blood pressure is 121/88 and his pulse is 83. His respiration is 20 and oxygen saturation is 98%.     Chief Complaint: URI    Patient reports his wife was diagnosed with influenza at this clinic this morning.    URI   This is a new problem. The current episode started yesterday. The problem has been gradually worsening. There has been no fever. Associated symptoms include congestion, coughing, headaches, sneezing and a sore throat. Pertinent negatives include no abdominal pain, chest pain, diarrhea, dysuria, ear pain, nausea, neck pain, rash, sinus pain, vomiting or wheezing.       Constitution: Positive for chills and fatigue. Negative for activity change, appetite change, fever, unexpected weight change and generalized weakness.   HENT:  Positive for congestion and sore throat. Negative for ear pain, ear discharge, foreign body in ear, tinnitus, hearing loss, dental problem, mouth sores, tongue pain, facial swelling, postnasal drip, sinus pain, sinus pressure, trouble swallowing and voice change.    Neck: Negative for neck pain, neck stiffness and painful lymph nodes.   Cardiovascular:  Negative for chest pain, leg swelling, palpitations and sob on exertion.   Eyes:  Negative for eye trauma, eye discharge, eye itching, eye pain, eye redness, vision loss and eyelid swelling.   Respiratory:  Positive for cough. Negative for chest tightness, sputum production, COPD, shortness of breath, wheezing and asthma.    Gastrointestinal:  Negative for abdominal pain, nausea, vomiting, constipation, diarrhea, bright red blood in stool and dark colored stools.   Endocrine: hair loss, cold intolerance and heat intolerance.   Genitourinary:  Negative for dysuria, frequency, urgency, flank pain and hematuria.   Musculoskeletal:  Negative for pain,  trauma, joint pain, joint swelling, abnormal ROM of joint, back pain and muscle cramps.   Skin:  Negative for color change, pale, rash, wound and hives.   Allergic/Immunologic: Positive for sneezing. Negative for environmental allergies, seasonal allergies, food allergies, asthma, hives and itching.   Neurological:  Positive for headaches. Negative for dizziness, history of vertigo, light-headedness, facial drooping, speech difficulty, disorientation, altered mental status, loss of consciousness and numbness.   Hematologic/Lymphatic: Negative for swollen lymph nodes and easy bruising/bleeding. Does not bruise/bleed easily.   Psychiatric/Behavioral:  Negative for altered mental status, disorientation, confusion, agitation, sleep disturbance and hallucinations.       Objective:     Physical Exam   Constitutional: He is oriented to person, place, and time. He appears well-developed. He is cooperative.   HENT:   Head: Normocephalic and atraumatic.   Ears:   Right Ear: Hearing, external ear and ear canal normal. A middle ear effusion is present.   Left Ear: Hearing, external ear and ear canal normal. A middle ear effusion is present.   Nose: Rhinorrhea present. No mucosal edema or nasal deformity. No epistaxis. Right sinus exhibits no maxillary sinus tenderness and no frontal sinus tenderness. Left sinus exhibits no maxillary sinus tenderness and no frontal sinus tenderness.   Mouth/Throat: Uvula is midline and mucous membranes are normal. No trismus in the jaw. Normal dentition. No uvula swelling. Posterior oropharyngeal erythema present. No oropharyngeal exudate or posterior oropharyngeal edema. No tonsillar exudate.   Eyes: Conjunctivae and lids are normal.   Neck: Trachea normal and phonation normal. Neck supple.   Cardiovascular: Normal rate, regular rhythm, normal heart sounds and normal pulses.   Pulmonary/Chest: Effort normal and breath sounds normal.   Abdominal: Normal appearance and bowel sounds are normal.  Soft.   Musculoskeletal: Normal range of motion.         General: Normal range of motion.   Neurological: He is alert and oriented to person, place, and time. He exhibits normal muscle tone.   Skin: Skin is warm, dry and intact.   Psychiatric: His speech is normal and behavior is normal. Judgment and thought content normal.   Nursing note and vitals reviewed.      Assessment:     1. Viral URI    2. Exposure to the flu    3. Cough, unspecified type        Plan:       Viral URI  -     oseltamivir (TAMIFLU) 75 MG capsule; Take 1 capsule (75 mg total) by mouth 2 (two) times daily. for 5 days  Dispense: 10 capsule; Refill: 0  -     dexAMETHasone injection 10 mg    Exposure to the flu  -     POCT Influenza A/B Rapid Antigen    Cough, unspecified type  -     POCT Influenza A/B Rapid Antigen  -     benzonatate (TESSALON) 100 MG capsule; Take 1 capsule (100 mg total) by mouth 3 (three) times daily as needed for Cough.  Dispense: 30 capsule; Refill: 0      Utilize over-the-counter Tylenol or Motrin as directed for fever.    Ensure adequate fluid intake with electrolytes.    Return to clinic for new or worsening symptoms.  Patient is recommended to follow-up with their PCP post discharge.    Total time spent on med rec, H&P, with over half of the time in direct patient care: 33 minutes       Additional MDM:     Heart Failure Score:   COPD = No

## 2024-01-04 DIAGNOSIS — I10 HYPERTENSION, UNSPECIFIED TYPE: ICD-10-CM

## 2024-01-04 DIAGNOSIS — F32.A DEPRESSION, UNSPECIFIED DEPRESSION TYPE: ICD-10-CM

## 2024-01-04 DIAGNOSIS — G43.909 MIGRAINE WITHOUT STATUS MIGRAINOSUS, NOT INTRACTABLE, UNSPECIFIED MIGRAINE TYPE: ICD-10-CM

## 2024-01-04 DIAGNOSIS — I10 ESSENTIAL HYPERTENSION: ICD-10-CM

## 2024-01-05 RX ORDER — LISINOPRIL 10 MG/1
10 TABLET ORAL DAILY
Qty: 90 TABLET | Refills: 1 | Status: SHIPPED | OUTPATIENT
Start: 2024-01-05 | End: 2025-01-04

## 2024-01-05 RX ORDER — TADALAFIL 20 MG/1
20 TABLET ORAL DAILY
Qty: 12 TABLET | Refills: 2 | Status: SHIPPED | OUTPATIENT
Start: 2024-01-05 | End: 2024-04-04

## 2024-01-05 RX ORDER — DULOXETIN HYDROCHLORIDE 20 MG/1
20 CAPSULE, DELAYED RELEASE ORAL DAILY
Qty: 90 CAPSULE | Refills: 1 | Status: SHIPPED | OUTPATIENT
Start: 2024-01-05

## 2024-01-05 RX ORDER — AMLODIPINE BESYLATE 10 MG/1
10 TABLET ORAL DAILY
Qty: 30 TABLET | Refills: 11 | Status: SHIPPED | OUTPATIENT
Start: 2024-01-05 | End: 2025-01-04

## 2024-02-06 ENCOUNTER — TELEPHONE (OUTPATIENT)
Dept: FAMILY MEDICINE | Facility: CLINIC | Age: 49
End: 2024-02-06
Payer: OTHER GOVERNMENT

## 2024-02-06 DIAGNOSIS — Z12.5 SPECIAL SCREENING FOR MALIGNANT NEOPLASM OF PROSTATE: ICD-10-CM

## 2024-02-06 DIAGNOSIS — I10 ESSENTIAL HYPERTENSION: ICD-10-CM

## 2024-02-06 DIAGNOSIS — Z12.11 SCREENING FOR MALIGNANT NEOPLASM OF COLON: ICD-10-CM

## 2024-02-06 DIAGNOSIS — Z00.00 ANNUAL PHYSICAL EXAM: ICD-10-CM

## 2024-02-06 DIAGNOSIS — Z79.899 ENCOUNTER FOR LONG-TERM (CURRENT) USE OF OTHER MEDICATIONS: Primary | ICD-10-CM

## 2024-02-20 ENCOUNTER — HOSPITAL ENCOUNTER (OUTPATIENT)
Dept: RADIOLOGY | Facility: HOSPITAL | Age: 49
Discharge: HOME | End: 2024-02-20
Attending: PHYSICIAN ASSISTANT
Payer: OTHER GOVERNMENT

## 2024-02-20 ENCOUNTER — OFFICE VISIT (OUTPATIENT)
Dept: FAMILY MEDICINE | Facility: CLINIC | Age: 49
End: 2024-02-20
Payer: OTHER GOVERNMENT

## 2024-02-20 VITALS
DIASTOLIC BLOOD PRESSURE: 66 MMHG | HEIGHT: 72 IN | WEIGHT: 229 LBS | BODY MASS INDEX: 31.02 KG/M2 | SYSTOLIC BLOOD PRESSURE: 126 MMHG

## 2024-02-20 DIAGNOSIS — I10 ESSENTIAL HYPERTENSION: Primary | ICD-10-CM

## 2024-02-20 DIAGNOSIS — F32.A DEPRESSION, UNSPECIFIED DEPRESSION TYPE: ICD-10-CM

## 2024-02-20 DIAGNOSIS — Z12.11 SPECIAL SCREENING FOR MALIGNANT NEOPLASMS, COLON: ICD-10-CM

## 2024-02-20 DIAGNOSIS — M54.9 DORSALGIA, UNSPECIFIED: ICD-10-CM

## 2024-02-20 DIAGNOSIS — N52.9 ERECTILE DYSFUNCTION, UNSPECIFIED ERECTILE DYSFUNCTION TYPE: ICD-10-CM

## 2024-02-20 PROCEDURE — 99214 OFFICE O/P EST MOD 30 MIN: CPT | Mod: S$GLB,,, | Performed by: PHYSICIAN ASSISTANT

## 2024-02-20 PROCEDURE — 72110 X-RAY EXAM L-2 SPINE 4/>VWS: CPT | Mod: TC,PO

## 2024-02-20 NOTE — PROGRESS NOTES
SUBJECTIVE:    Patient ID: Román Acosta Jr. is a 48 y.o. male.    Chief Complaint: Follow-up (No bottles//6 month HTN follow up//no complaints//will get labs today)    This is a 47 yo male here for 6 month follow up. Increased body pains with change in weather, especially left neck radiating down the left arm. Managing pain with biofreeze. Blood pressure within range in office, reports that it has been stable at home, on amlodipine and lisinopril.  Has not completed cologuard, would like another box.    Does report that he has some progressive Low back pain. Numbness both thighs and down to the feet. Has some bladder sensation issues. If he doesn't make it to the bathroom soon enough he'll lose urine. Has to keep cup in truck with him. Will a lot of times become wet because he doesn't feel urge. Has been about 6 years since seeing pain management for the back. Was getting ESIs at that time. Unsure weather it actually helped    Follow-up  Associated symptoms include arthralgias, headaches, neck pain and numbness. Pertinent negatives include no abdominal pain, chest pain, congestion, fatigue, fever, joint swelling, vomiting or weakness.       Office Visit on 12/28/2023   Component Date Value Ref Range Status    Rapid Influenza A Ag 12/28/2023 Negative  Negative Final    Rapid Influenza B Ag 12/28/2023 Negative  Negative Final     Acceptable 12/28/2023 Yes   Final       No past medical history on file.  No past surgical history on file.  No family history on file.    Marital Status:   Alcohol History:  reports no history of alcohol use.  Tobacco History:  reports that he has been smoking. His smokeless tobacco use includes snuff.  Drug History:  has no history on file for drug use.    Review of patient's allergies indicates:  No Known Allergies    Current Outpatient Medications:     amLODIPine (NORVASC) 10 MG tablet, Take 1 tablet (10 mg total) by mouth once daily., Disp: 30 tablet, Rfl:  11    ascorbic acid, vitamin C, (VITAMIN C) 100 MG tablet, Take 100 mg by mouth once daily., Disp: , Rfl:     b complex vitamins capsule, Take 1 capsule by mouth once daily., Disp: , Rfl:     cetirizine HCl (ALLERGY RELIEF, CETIRIZINE, ORAL), Take by mouth., Disp: , Rfl:     co-enzyme Q-10 30 mg capsule, Take 30 mg by mouth 3 (three) times daily., Disp: , Rfl:     DULoxetine (CYMBALTA) 20 MG capsule, Take 1 capsule (20 mg total) by mouth once daily., Disp: 90 capsule, Rfl: 1    lisinopriL 10 MG tablet, Take 1 tablet (10 mg total) by mouth once daily., Disp: 90 tablet, Rfl: 1    omeprazole (PRILOSEC) 20 MG capsule, Take 20 mg by mouth., Disp: , Rfl:     tadalafiL (CIALIS) 20 MG Tab, Take 1 tablet (20 mg total) by mouth once daily., Disp: 12 tablet, Rfl: 2    ubrogepant (UBROGEPANT) 100 mg tablet, Take 1 tablet (100 mg total) by mouth daily as needed for Migraine., Disp: 30 tablet, Rfl: 2    vitamin D (VITAMIN D3) 1000 units Tab, Take 1,000 Units by mouth once daily., Disp: , Rfl:     vitamin E 100 UNIT capsule, Take 100 Units by mouth once daily., Disp: , Rfl:     zinc gluconate 50 mg tablet, Take 50 mg by mouth once daily., Disp: , Rfl:     Review of Systems   Constitutional:  Negative for activity change, appetite change, fatigue, fever and unexpected weight change.   HENT:  Negative for congestion, hearing loss, rhinorrhea and trouble swallowing.    Eyes:  Negative for discharge and visual disturbance.   Respiratory:  Negative for chest tightness, shortness of breath and wheezing.    Cardiovascular:  Negative for chest pain and palpitations.   Gastrointestinal:  Negative for abdominal distention, abdominal pain, blood in stool, constipation, diarrhea and vomiting.   Endocrine: Negative for polydipsia and polyuria.   Genitourinary:  Positive for urgency. Negative for difficulty urinating and hematuria.        Sensation issues with urination   Musculoskeletal:  Positive for arthralgias and neck pain. Negative for  joint swelling.   Neurological:  Positive for numbness and headaches. Negative for weakness.   Psychiatric/Behavioral:  Negative for confusion and dysphoric mood.           Objective:      Vitals:    24 0736   BP: 126/66   Weight: 103.9 kg (229 lb)   Height: 6' (1.829 m)     Physical Exam  Constitutional:       Appearance: Normal appearance.   HENT:      Head: Normocephalic and atraumatic.   Eyes:      Extraocular Movements: Extraocular movements intact.      Pupils: Pupils are equal, round, and reactive to light.   Cardiovascular:      Pulses: Normal pulses.   Pulmonary:      Effort: Pulmonary effort is normal.      Breath sounds: Normal breath sounds.   Abdominal:      General: Abdomen is flat.      Palpations: Abdomen is soft.   Skin:     General: Skin is warm and dry.   Neurological:      Mental Status: He is alert.           Assessment:       1. Essential hypertension    2. Depression, unspecified depression type    3. Erectile dysfunction, unspecified erectile dysfunction type    4. Special screening for malignant neoplasms, colon    5. Dorsalgia, unspecified         Plan:       Essential hypertension  Comments:  Well maintained on amlodipine and lisinopril, continue    Depression, unspecified depression type  Comments:  Doing well on current Cymbalta dosage    Erectile dysfunction, unspecified erectile dysfunction type  Comments:  Continue cialis prn    Special screening for malignant neoplasms, colon  Comments:  Has not completed cologuard in past ( test), does not have time for colonoscopy. Encouraged him to complete cologuard test this time, patient agree.  Orders:  -     Cologuard Screening (Multitarget Stool DNA); Future; Expected date: 2024    Dorsalgia, unspecified  Comments:  warrants further evaluation with MRI. progressive neurological deficit and hx of severe back injury with prior  service.  Orders:  -     X-Ray Lumbar Spine 5 View; Future; Expected date: 2024  -      MRI Lumbar Spine Without Contrast; Future; Expected date: 02/20/2024      Follow up in about 3 months (around 5/20/2024).        2/20/2024 Hector Devine PA-C

## 2024-02-21 LAB
ALBUMIN SERPL-MCNC: 4.6 G/DL (ref 3.6–5.1)
ALBUMIN/CREAT UR: 3 MCG/MG CREAT
ALBUMIN/GLOB SERPL: 1.8 (CALC) (ref 1–2.5)
ALP SERPL-CCNC: 72 U/L (ref 35–144)
ALT SERPL-CCNC: 41 U/L (ref 9–46)
APPEARANCE UR: CLEAR
AST SERPL-CCNC: 18 U/L (ref 10–35)
BACTERIA #/AREA URNS HPF: NORMAL /HPF
BACTERIA UR CULT: NORMAL
BASOPHILS # BLD AUTO: 62 CELLS/UL (ref 0–200)
BASOPHILS NFR BLD AUTO: 1 %
BILIRUB SERPL-MCNC: 0.5 MG/DL (ref 0.2–1.2)
BILIRUB UR QL STRIP: NEGATIVE
BUN SERPL-MCNC: 13 MG/DL (ref 7–25)
BUN/CREAT SERPL: NORMAL (CALC) (ref 6–22)
CALCIUM SERPL-MCNC: 9.7 MG/DL (ref 8.6–10.3)
CHLORIDE SERPL-SCNC: 103 MMOL/L (ref 98–110)
CHOLEST SERPL-MCNC: 205 MG/DL
CHOLEST/HDLC SERPL: 4 (CALC)
CO2 SERPL-SCNC: 29 MMOL/L (ref 20–32)
COLOR UR: YELLOW
CREAT SERPL-MCNC: 0.99 MG/DL (ref 0.7–1.3)
CREAT UR-MCNC: 146 MG/DL (ref 20–320)
EGFR: 93 ML/MIN/1.73M2
EOSINOPHIL # BLD AUTO: 112 CELLS/UL (ref 15–500)
EOSINOPHIL NFR BLD AUTO: 1.8 %
ERYTHROCYTE [DISTWIDTH] IN BLOOD BY AUTOMATED COUNT: 13.5 % (ref 11–15)
GLOBULIN SER CALC-MCNC: 2.6 G/DL (CALC) (ref 1.9–3.7)
GLUCOSE SERPL-MCNC: 78 MG/DL (ref 65–99)
GLUCOSE UR QL STRIP: NEGATIVE
HCT VFR BLD AUTO: 45 % (ref 38.5–50)
HDLC SERPL-MCNC: 51 MG/DL
HGB BLD-MCNC: 14.8 G/DL (ref 13.2–17.1)
HGB UR QL STRIP: NEGATIVE
HYALINE CASTS #/AREA URNS LPF: NORMAL /LPF
KETONES UR QL STRIP: NEGATIVE
LDLC SERPL CALC-MCNC: 135 MG/DL (CALC)
LEUKOCYTE ESTERASE UR QL STRIP: NEGATIVE
LYMPHOCYTES # BLD AUTO: 1978 CELLS/UL (ref 850–3900)
LYMPHOCYTES NFR BLD AUTO: 31.9 %
MCH RBC QN AUTO: 29.2 PG (ref 27–33)
MCHC RBC AUTO-ENTMCNC: 32.9 G/DL (ref 32–36)
MCV RBC AUTO: 88.8 FL (ref 80–100)
MICROALBUMIN UR-MCNC: 0.5 MG/DL
MONOCYTES # BLD AUTO: 546 CELLS/UL (ref 200–950)
MONOCYTES NFR BLD AUTO: 8.8 %
NEUTROPHILS # BLD AUTO: 3503 CELLS/UL (ref 1500–7800)
NEUTROPHILS NFR BLD AUTO: 56.5 %
NITRITE UR QL STRIP: NEGATIVE
NONHDLC SERPL-MCNC: 154 MG/DL (CALC)
PH UR STRIP: 5.5 [PH] (ref 5–8)
PLATELET # BLD AUTO: 243 THOUSAND/UL (ref 140–400)
PMV BLD REES-ECKER: 10.6 FL (ref 7.5–12.5)
POTASSIUM SERPL-SCNC: 4 MMOL/L (ref 3.5–5.3)
PROT SERPL-MCNC: 7.2 G/DL (ref 6.1–8.1)
PROT UR QL STRIP: NEGATIVE
PSA SERPL-MCNC: 0.35 NG/ML
RBC # BLD AUTO: 5.07 MILLION/UL (ref 4.2–5.8)
RBC #/AREA URNS HPF: NORMAL /HPF
SERVICE CMNT-IMP: NORMAL
SODIUM SERPL-SCNC: 140 MMOL/L (ref 135–146)
SP GR UR STRIP: 1.02 (ref 1–1.03)
SQUAMOUS #/AREA URNS HPF: NORMAL /HPF
TRIGL SERPL-MCNC: 90 MG/DL
TSH SERPL-ACNC: 2.66 MIU/L (ref 0.4–4.5)
WBC # BLD AUTO: 6.2 THOUSAND/UL (ref 3.8–10.8)
WBC #/AREA URNS HPF: NORMAL /HPF

## 2024-03-06 LAB — NONINV COLON CA DNA+OCC BLD SCRN STL QL: NEGATIVE

## 2024-07-06 DIAGNOSIS — I10 HYPERTENSION, UNSPECIFIED TYPE: ICD-10-CM

## 2024-07-06 DIAGNOSIS — I10 ESSENTIAL HYPERTENSION: ICD-10-CM

## 2024-07-06 DIAGNOSIS — G43.909 MIGRAINE WITHOUT STATUS MIGRAINOSUS, NOT INTRACTABLE, UNSPECIFIED MIGRAINE TYPE: ICD-10-CM

## 2024-07-06 DIAGNOSIS — F32.A DEPRESSION, UNSPECIFIED DEPRESSION TYPE: ICD-10-CM

## 2024-07-08 RX ORDER — AMLODIPINE BESYLATE 10 MG/1
10 TABLET ORAL DAILY
Qty: 30 TABLET | Refills: 11 | Status: SHIPPED | OUTPATIENT
Start: 2024-07-08 | End: 2025-07-08

## 2024-07-08 RX ORDER — TADALAFIL 20 MG/1
20 TABLET ORAL DAILY
Qty: 12 TABLET | Refills: 2 | Status: SHIPPED | OUTPATIENT
Start: 2024-07-08 | End: 2024-10-06

## 2024-07-08 RX ORDER — LISINOPRIL 10 MG/1
10 TABLET ORAL DAILY
Qty: 90 TABLET | Refills: 1 | Status: SHIPPED | OUTPATIENT
Start: 2024-07-08 | End: 2025-07-08

## 2024-07-08 RX ORDER — DULOXETIN HYDROCHLORIDE 20 MG/1
20 CAPSULE, DELAYED RELEASE ORAL DAILY
Qty: 90 CAPSULE | Refills: 1 | Status: SHIPPED | OUTPATIENT
Start: 2024-07-08

## 2024-10-15 ENCOUNTER — PATIENT MESSAGE (OUTPATIENT)
Dept: FAMILY MEDICINE | Facility: CLINIC | Age: 49
End: 2024-10-15
Payer: OTHER GOVERNMENT

## 2024-12-05 ENCOUNTER — OFFICE VISIT (OUTPATIENT)
Dept: FAMILY MEDICINE | Facility: CLINIC | Age: 49
End: 2024-12-05
Payer: OTHER GOVERNMENT

## 2024-12-05 ENCOUNTER — HOSPITAL ENCOUNTER (OUTPATIENT)
Dept: RADIOLOGY | Facility: HOSPITAL | Age: 49
Discharge: HOME | End: 2024-12-05
Attending: PHYSICIAN ASSISTANT
Payer: OTHER GOVERNMENT

## 2024-12-05 VITALS
HEIGHT: 72 IN | BODY MASS INDEX: 31.15 KG/M2 | RESPIRATION RATE: 18 BRPM | WEIGHT: 230 LBS | DIASTOLIC BLOOD PRESSURE: 68 MMHG | HEART RATE: 74 BPM | SYSTOLIC BLOOD PRESSURE: 124 MMHG | OXYGEN SATURATION: 96 %

## 2024-12-05 DIAGNOSIS — M50.30 DDD (DEGENERATIVE DISC DISEASE), CERVICAL: Primary | ICD-10-CM

## 2024-12-05 DIAGNOSIS — M50.30 DDD (DEGENERATIVE DISC DISEASE), CERVICAL: ICD-10-CM

## 2024-12-05 DIAGNOSIS — J32.9 SINUSITIS, UNSPECIFIED CHRONICITY, UNSPECIFIED LOCATION: ICD-10-CM

## 2024-12-05 PROCEDURE — 99214 OFFICE O/P EST MOD 30 MIN: CPT | Mod: S$GLB,,, | Performed by: PHYSICIAN ASSISTANT

## 2024-12-05 PROCEDURE — 72050 X-RAY EXAM NECK SPINE 4/5VWS: CPT | Mod: TC,PO

## 2024-12-05 PROCEDURE — 72050 X-RAY EXAM NECK SPINE 4/5VWS: CPT | Mod: 26,,, | Performed by: RADIOLOGY

## 2024-12-05 PROCEDURE — 73030 X-RAY EXAM OF SHOULDER: CPT | Mod: 26,LT,, | Performed by: RADIOLOGY

## 2024-12-05 PROCEDURE — 73030 X-RAY EXAM OF SHOULDER: CPT | Mod: TC,PO,LT

## 2024-12-05 RX ORDER — AMOXICILLIN AND CLAVULANATE POTASSIUM 875; 125 MG/1; MG/1
1 TABLET, FILM COATED ORAL 2 TIMES DAILY
Qty: 20 TABLET | Refills: 0 | Status: SHIPPED | OUTPATIENT
Start: 2024-12-05 | End: 2024-12-15

## 2024-12-05 RX ORDER — FLUTICASONE PROPIONATE 50 MCG
2 SPRAY, SUSPENSION (ML) NASAL 2 TIMES DAILY
Qty: 18.2 ML | Refills: 2 | Status: SHIPPED | OUTPATIENT
Start: 2024-12-05 | End: 2025-06-03

## 2024-12-05 RX ORDER — METHYLPREDNISOLONE 4 MG/1
TABLET ORAL
Qty: 21 EACH | Refills: 0 | Status: SHIPPED | OUTPATIENT
Start: 2024-12-05 | End: 2024-12-26

## 2024-12-05 NOTE — PROGRESS NOTES
"  SUBJECTIVE:    Patient ID: Román Acosta Jr. is a 49 y.o. male.    Chief Complaint: Follow-up (No bottles// no refills needed// pt states he's been having sinus issues going on for 3 months also having left shoulder pain level 8 that goes down to arm going on for 4 weeks//pt refused flu vaccine )    History of Present Illness    CHIEF COMPLAINT:  Román presents today for sinus allergies and shoulder pain.    SINUS ALLERGIES:  He reports experiencing sinus allergies for approximately three months with constant sneezing, altered speech, persistent sinus pressure, and drainage. He has a morning cough and post-nasal drip, particularly during the first 30 minutes after waking. He has tried nasal sprays, including switching to a different one, and a daily sinus pill without improvement. Over-the-counter medications have been ineffective in managing symptoms. He has a history of flu and sinus infection last year, for which he received treatment at [facility], including a steroid injection that provided relief.    MUSCULOSKELETAL PAIN:  He reports left shoulder and neck pain radiating down the left arm for the past 3-4 weeks. The pain originates from the trapezius muscle and extends down the arm, described as feeling like someone has "frogged" him all the way down. Pain is exacerbated when inactive, particularly when sitting still, and improves with movement. He experiences relief when applying pressure to the affected area, such as using a rolled-up pillow at home or grabbing the headrest while driving. He denies any distinct recent injury, numbness in fingers, or noticeable weakness compared to the right arm. The condition is affecting his ability to drive comfortably for work.    MEDICAL HISTORY:  He uses a CPAP machine. A lumbar x-ray was performed earlier this year.      ROS:  General: -fever, -chills, -fatigue, -weight gain, -weight loss  Eyes: -vision changes, -redness, -discharge  ENT: -ear pain, -nasal " congestion, -sore throat, +post nasal drip  Cardiovascular: -chest pain, -palpitations, -lower extremity edema  Respiratory: +cough, -shortness of breath  Gastrointestinal: -abdominal pain, -nausea, -vomiting, -diarrhea, -constipation, -blood in stool  Genitourinary: -dysuria, -hematuria, -frequency  Musculoskeletal: -joint pain, +muscle pain, -muscle weakness  Skin: -rash, -lesion  Neurological: -headache, -dizziness, -numbness, -tingling  Psychiatric: -anxiety, -depression, -sleep difficulty  Allergic: +frequent sneezing         No visits with results within 6 Month(s) from this visit.   Latest known visit with results is:   Office Visit on 02/20/2024   Component Date Value Ref Range Status    Cologuard Result 02/25/2024 Negative  Negative Final       History reviewed. No pertinent past medical history.  History reviewed. No pertinent surgical history.  No family history on file.    Marital Status:   Alcohol History:  reports no history of alcohol use.  Tobacco History:  reports that he has been smoking. His smokeless tobacco use includes snuff.  Drug History:  has no history on file for drug use.    Review of patient's allergies indicates:  No Known Allergies    Current Outpatient Medications:     amLODIPine (NORVASC) 10 MG tablet, Take 1 tablet (10 mg total) by mouth once daily., Disp: 30 tablet, Rfl: 11    ascorbic acid, vitamin C, (VITAMIN C) 100 MG tablet, Take 100 mg by mouth once daily., Disp: , Rfl:     b complex vitamins capsule, Take 1 capsule by mouth once daily., Disp: , Rfl:     cetirizine HCl (ALLERGY RELIEF, CETIRIZINE, ORAL), Take by mouth., Disp: , Rfl:     co-enzyme Q-10 30 mg capsule, Take 30 mg by mouth 3 (three) times daily., Disp: , Rfl:     DULoxetine (CYMBALTA) 20 MG capsule, Take 1 capsule (20 mg total) by mouth once daily., Disp: 90 capsule, Rfl: 1    lisinopriL 10 MG tablet, Take 1 tablet (10 mg total) by mouth once daily., Disp: 90 tablet, Rfl: 1    omeprazole (PRILOSEC) 20 MG  capsule, Take 20 mg by mouth., Disp: , Rfl:     tadalafiL (CIALIS) 20 MG Tab, Take 1 tablet (20 mg total) by mouth once daily., Disp: 12 tablet, Rfl: 2    vitamin D (VITAMIN D3) 1000 units Tab, Take 1,000 Units by mouth once daily., Disp: , Rfl:     vitamin E 100 UNIT capsule, Take 100 Units by mouth once daily., Disp: , Rfl:     zinc gluconate 50 mg tablet, Take 50 mg by mouth once daily., Disp: , Rfl:     amoxicillin-clavulanate 875-125mg (AUGMENTIN) 875-125 mg per tablet, Take 1 tablet by mouth 2 (two) times daily. for 10 days, Disp: 20 tablet, Rfl: 0    fluticasone propionate (FLONASE) 50 mcg/actuation nasal spray, 2 sprays (100 mcg total) by Each Nostril route 2 (two) times a day., Disp: 18.2 mL, Rfl: 2    methylPREDNISolone (MEDROL DOSEPACK) 4 mg tablet, use as directed, Disp: 21 each, Rfl: 0    ubrogepant (UBROGEPANT) 100 mg tablet, Take 1 tablet (100 mg total) by mouth daily as needed for Migraine. (Patient not taking: Reported on 12/5/2024), Disp: 30 tablet, Rfl: 2    Objective:      Vitals:    12/05/24 1032   BP: 124/68   Pulse: 74   Resp: 18   SpO2: 96%   Weight: 104.3 kg (230 lb)   Height: 6' (1.829 m)     Physical Exam    General: No acute distress. Well-developed. Well-nourished.  Eyes: EOMI. Sclerae anicteric.  HENT: Normocephalic. Atraumatic. Nares patent. Moist oral mucosa.  Ears: Bilateral TMs clear. Bilateral EACs clear.  Cardiovascular: Regular rate. Regular rhythm. No murmurs. No rubs. No gallops. Normal S1, S2.  Respiratory: Normal respiratory effort. Clear to auscultation bilaterally. No rales. No rhonchi. No wheezing.  Abdomen: Soft. Non-tender. Non-distended. Normoactive bowel sounds.  Musculoskeletal: No  obvious deformity. Muscle tension in left trapezius.  Extremities: No lower extremity edema.  Neurological: Alert & oriented x3. No slurred speech. Normal gait.  Psychiatric: Normal mood. Normal affect. Good insight. Good judgment.  Skin: Warm. Dry. No rash.  MSK: Shoulder - Left: Full  range of motion in left shoulder with pain. Pain with external rotation of left shoulder.         Assessment:       Assessment & Plan    - Recommend targeted therapy for neck and shoulder pain, including manual therapy, ultrasound massage, and dry needling  - Considered MRI if no improvement after physical therapy  - Initiated antibiotic and steroid taper for persistent sinus symptoms  - Ordered x-rays of neck and shoulder to rule out any glaring issues before starting physical therapy    SINUSITIS AND NASAL SYMPTOMS:  - Use sinus rinse to help with nasal symptoms.  - Started antibiotic for 10 days to treat sinus infection.  - Started Medrol taper (steroid) to address inflammation in sinuses, neck, and shoulder.  - Started Flonase nasal spray: 1-2 sprays in each nostril morning and evening for nasal drainage.    CERVICAL AND SHOULDER ISSUES:  - X-rays of neck and shoulder ordered.  - Referred to Y[facility] at [facility] for physical therapy targeting neck and shoulder.    FOLLOW-UP:  - Contact the office if no improvement after physical therapy to discuss potential MRI.       Plan:       DDD (degenerative disc disease), cervical  -     X-Ray Cervical Spine Complete 5 view; Future; Expected date: 12/05/2024  -     X-Ray Shoulder 2 or More Views Left; Future; Expected date: 12/05/2024  -     Ambulatory referral/consult to Physical/Occupational Therapy; Future; Expected date: 12/05/2024    Sinusitis, unspecified chronicity, unspecified location  -     amoxicillin-clavulanate 875-125mg (AUGMENTIN) 875-125 mg per tablet; Take 1 tablet by mouth 2 (two) times daily. for 10 days  Dispense: 20 tablet; Refill: 0  -     methylPREDNISolone (MEDROL DOSEPACK) 4 mg tablet; use as directed  Dispense: 21 each; Refill: 0  -     fluticasone propionate (FLONASE) 50 mcg/actuation nasal spray; 2 sprays (100 mcg total) by Each Nostril route 2 (two) times a day.  Dispense: 18.2 mL; Refill: 2      Follow up in about 6 months (around  6/5/2025), or if symptoms worsen or fail to improve.    This note was generated with the assistance of ambient listening technology. Verbal consent was obtained by the patient and accompanying visitor(s) for the recording of patient appointment to facilitate this note. I attest to having reviewed and edited the generated note for accuracy, though some syntax or spelling errors may persist. Please contact the author of this note for any clarification.          12/5/2024 Hector Devine PA-C      Answers submitted by the patient for this visit:  Review of Systems Questionnaire (Submitted on 12/3/2024)  activity change: No  unexpected weight change: No  neck pain: Yes  hearing loss: No  rhinorrhea: Yes  trouble swallowing: No  eye discharge: No  visual disturbance: No  chest tightness: No  wheezing: No  chest pain: No  palpitations: No  blood in stool: No  constipation: No  vomiting: No  diarrhea: No  polydipsia: No  polyuria: No  difficulty urinating: No  urgency: Yes  hematuria: No  joint swelling: Yes  arthralgias: Yes  headaches: Yes  weakness: No  confusion: No  dysphoric mood: No

## 2024-12-24 DIAGNOSIS — M50.30 DDD (DEGENERATIVE DISC DISEASE), CERVICAL: Primary | ICD-10-CM

## 2024-12-24 RX ORDER — GABAPENTIN 100 MG/1
100 CAPSULE ORAL 3 TIMES DAILY
Qty: 90 CAPSULE | Refills: 0 | Status: SHIPPED | OUTPATIENT
Start: 2024-12-24 | End: 2025-12-24

## 2024-12-24 NOTE — TELEPHONE ENCOUNTER
I am okay to start 100 mg t.i.d. and see how he does.  Dispense 90 with 0 refills an update us in a few weeks

## 2024-12-24 NOTE — TELEPHONE ENCOUNTER
----- Message from Neelam sent at 12/24/2024 11:21 AM CST -----  Physical therapy has not been set up for patient yet. Asking for gabapentin for nerves.   [facility]   [phone]

## 2024-12-24 NOTE — TELEPHONE ENCOUNTER
You have not prescribed gabapentin, but he said you discussed @ last visit.   Refaxed his PT orders today

## 2025-01-05 DIAGNOSIS — G43.909 MIGRAINE WITHOUT STATUS MIGRAINOSUS, NOT INTRACTABLE, UNSPECIFIED MIGRAINE TYPE: ICD-10-CM

## 2025-01-05 DIAGNOSIS — I10 ESSENTIAL HYPERTENSION: ICD-10-CM

## 2025-01-05 DIAGNOSIS — F32.A DEPRESSION, UNSPECIFIED DEPRESSION TYPE: ICD-10-CM

## 2025-01-05 DIAGNOSIS — I10 HYPERTENSION, UNSPECIFIED TYPE: ICD-10-CM

## 2025-01-06 RX ORDER — TADALAFIL 20 MG/1
20 TABLET ORAL DAILY
Qty: 12 TABLET | Refills: 2 | Status: SHIPPED | OUTPATIENT
Start: 2025-01-06 | End: 2025-04-06

## 2025-01-06 RX ORDER — LISINOPRIL 10 MG/1
10 TABLET ORAL DAILY
Qty: 90 TABLET | Refills: 1 | Status: SHIPPED | OUTPATIENT
Start: 2025-01-06 | End: 2026-01-06

## 2025-01-06 RX ORDER — DULOXETIN HYDROCHLORIDE 20 MG/1
20 CAPSULE, DELAYED RELEASE ORAL DAILY
Qty: 90 CAPSULE | Refills: 1 | Status: SHIPPED | OUTPATIENT
Start: 2025-01-06

## 2025-01-06 RX ORDER — AMLODIPINE BESYLATE 10 MG/1
10 TABLET ORAL DAILY
Qty: 30 TABLET | Refills: 11 | Status: SHIPPED | OUTPATIENT
Start: 2025-01-06 | End: 2026-01-06

## 2025-01-29 DIAGNOSIS — M50.30 DDD (DEGENERATIVE DISC DISEASE), CERVICAL: ICD-10-CM

## 2025-01-30 RX ORDER — GABAPENTIN 100 MG/1
100 CAPSULE ORAL 3 TIMES DAILY
Qty: 90 CAPSULE | Refills: 0 | Status: SHIPPED | OUTPATIENT
Start: 2025-01-30 | End: 2026-01-30

## 2025-02-26 ENCOUNTER — OFFICE VISIT (OUTPATIENT)
Dept: URGENT CARE | Facility: CLINIC | Age: 50
End: 2025-02-26
Payer: OTHER GOVERNMENT

## 2025-02-26 VITALS
WEIGHT: 240 LBS | HEIGHT: 72 IN | OXYGEN SATURATION: 98 % | BODY MASS INDEX: 32.51 KG/M2 | SYSTOLIC BLOOD PRESSURE: 134 MMHG | HEART RATE: 85 BPM | RESPIRATION RATE: 18 BRPM | TEMPERATURE: 99 F | DIASTOLIC BLOOD PRESSURE: 90 MMHG

## 2025-02-26 DIAGNOSIS — J01.40 ACUTE NON-RECURRENT PANSINUSITIS: Primary | ICD-10-CM

## 2025-02-26 RX ORDER — LORATADINE PSEUDOEPHEDRINE SULFATE 10; 240 MG/1; MG/1
1 TABLET, EXTENDED RELEASE ORAL DAILY
COMMUNITY
Start: 2025-02-26 | End: 2025-03-08

## 2025-02-26 RX ORDER — AZELASTINE 1 MG/ML
2 SPRAY, METERED NASAL 2 TIMES DAILY
Qty: 30 ML | Refills: 0 | Status: SHIPPED | OUTPATIENT
Start: 2025-02-26 | End: 2026-02-26

## 2025-02-26 RX ORDER — DEXAMETHASONE SODIUM PHOSPHATE 4 MG/ML
8 INJECTION, SOLUTION INTRA-ARTICULAR; INTRALESIONAL; INTRAMUSCULAR; INTRAVENOUS; SOFT TISSUE
Status: COMPLETED | OUTPATIENT
Start: 2025-02-26 | End: 2025-02-26

## 2025-02-26 RX ORDER — AMOXICILLIN AND CLAVULANATE POTASSIUM 875; 125 MG/1; MG/1
1 TABLET, FILM COATED ORAL EVERY 12 HOURS
Qty: 20 TABLET | Refills: 0 | Status: SHIPPED | OUTPATIENT
Start: 2025-02-26 | End: 2025-03-08

## 2025-02-26 RX ADMIN — DEXAMETHASONE SODIUM PHOSPHATE 8 MG: 4 INJECTION, SOLUTION INTRA-ARTICULAR; INTRALESIONAL; INTRAMUSCULAR; INTRAVENOUS; SOFT TISSUE at 02:02

## 2025-02-26 NOTE — PROGRESS NOTES
Subjective:      Patient ID: Román Acosta Jr. is a 49 y.o. male.    Vitals:  height is 6' (1.829 m) and weight is 108.9 kg (240 lb). His temperature is 98.8 °F (37.1 °C). His blood pressure is 134/90 (abnormal) and his pulse is 85. His respiration is 18 and oxygen saturation is 98%.     Chief Complaint: Generalized Body Aches    Patient is a 49-year-old male with a past medical history of DDD, depression, migraines, and hypertension who presents to clinic for evaluation of sinus related issues.  Patient reports feels like he has been hit by a truck.  Patient reports his wife is sick with similar symptoms.  Patient states symptoms began last night.  Patient reports he is not vaccinated.  Patient reports took DayQuil this morning at 8:00 a.m. with no significant relief to symptoms.  Patient states wife wanted him to come in today get evaluated as she has been treated for similar symptoms as him.        Constitution: Positive for chills and fatigue.   HENT:  Positive for ear pain (Ear pressure), congestion, postnasal drip, sinus pressure and sore throat. Negative for ear discharge, tinnitus and hearing loss.    Neck: neck negative.   Cardiovascular: Negative.  Negative for chest pain and palpitations.   Eyes: Negative.    Respiratory:  Negative for chest tightness, cough and shortness of breath.    Gastrointestinal: Negative.  Negative for abdominal pain, nausea, vomiting and diarrhea.   Endocrine: negative.   Genitourinary: Negative.    Musculoskeletal:  Positive for muscle ache.   Skin: Negative.  Negative for color change, pale, rash and erythema.   Allergic/Immunologic: Negative.    Neurological:  Positive for headaches. Negative for dizziness, light-headedness, passing out, disorientation and altered mental status.   Hematologic/Lymphatic: Negative.    Psychiatric/Behavioral: Negative.  Negative for altered mental status, disorientation and confusion.       Objective:     Physical Exam   Constitutional: He is  oriented to person, place, and time. He appears well-developed. He is cooperative.  Non-toxic appearance. He does not appear ill. No distress.   HENT:   Head: Normocephalic and atraumatic.   Ears:   Right Ear: Hearing, tympanic membrane, external ear and ear canal normal.   Left Ear: Hearing, tympanic membrane, external ear and ear canal normal.   Nose: Congestion present. No mucosal edema, rhinorrhea or nasal deformity. No epistaxis. Right sinus exhibits maxillary sinus tenderness and frontal sinus tenderness. Left sinus exhibits maxillary sinus tenderness and frontal sinus tenderness.   Mouth/Throat: Uvula is midline, oropharynx is clear and moist and mucous membranes are normal. Mucous membranes are moist. No trismus in the jaw. Normal dentition. No uvula swelling. No oropharyngeal exudate or posterior oropharyngeal erythema. Oropharynx is clear.   Eyes: Conjunctivae and lids are normal. Pupils are equal, round, and reactive to light. Right eye exhibits no discharge. Left eye exhibits no discharge. No scleral icterus.   Neck: Trachea normal and phonation normal. Neck supple. No neck rigidity present.   Cardiovascular: Normal rate, regular rhythm, normal heart sounds and normal pulses.   Pulmonary/Chest: Effort normal and breath sounds normal. No respiratory distress. He has no wheezes. He has no rhonchi. He has no rales.   Abdominal: Normal appearance and bowel sounds are normal. He exhibits no distension. Soft. There is no abdominal tenderness.   Musculoskeletal: Normal range of motion.         General: Normal range of motion.      Cervical back: He exhibits no tenderness.   Lymphadenopathy:     He has no cervical adenopathy.   Neurological: He is alert and oriented to person, place, and time. He exhibits normal muscle tone.   Skin: Skin is warm, dry, intact, not diaphoretic, not pale and no rash. Capillary refill takes less than 2 seconds. No erythema   Psychiatric: His speech is normal and behavior is normal.  Judgment and thought content normal.   Nursing note and vitals reviewed.      Assessment:     1. Acute non-recurrent pansinusitis        Plan:       Acute non-recurrent pansinusitis    Other orders  -     amoxicillin-clavulanate 875-125mg (AUGMENTIN) 875-125 mg per tablet; Take 1 tablet by mouth every 12 (twelve) hours. for 10 days  Dispense: 20 tablet; Refill: 0  -     loratadine-pseudoephedrine  mg (CLARITIN-D 24 HOUR)  mg per 24 hr tablet; Take 1 tablet by mouth once daily. for 10 days  -     azelastine (ASTELIN) 137 mcg (0.1 %) nasal spray; 2 sprays (274 mcg total) by Nasal route 2 (two) times daily.  Dispense: 30 mL; Refill: 0  -     dexAMETHasone injection 8 mg                Labs:  Patient refused point of care testing.    Decadron 8 mg IM in clinic per patient request.  Patient tolerated well.  No complications noted.  Take medications as prescribed.  Tylenol/Motrin per package instructions for any pain or fever.  Assure adequate hydration and rest.  Throat lozenges or Chloraseptic per package instructions for sore throat.    Warm salt water gargles every 2-3 hours as needed for sore throat.    Nasal saline flushes or irrigation as directed for nasal saline congestion and sinus related symptoms.  Follow-up with PCP in 1-2 days.  Return to clinic as needed.  To ED for any new or acutely worsening symptoms.  Patient in agreement with plan of care.    DISCLAIMER: Please note that my documentation in this Electronic Healthcare Record was produced using speech recognition software and therefore may contain errors related to that software system.These could include grammar, punctuation and spelling errors or the inclusion/exclusion of phrases that were not intended. Garbled syntax, mangled pronouns, and other bizarre constructions may be attributed to that software system.

## 2025-03-05 DIAGNOSIS — J32.9 SINUSITIS, UNSPECIFIED CHRONICITY, UNSPECIFIED LOCATION: ICD-10-CM

## 2025-03-05 DIAGNOSIS — F32.A DEPRESSION, UNSPECIFIED DEPRESSION TYPE: ICD-10-CM

## 2025-03-06 RX ORDER — FLUTICASONE PROPIONATE 50 MCG
2 SPRAY, SUSPENSION (ML) NASAL 2 TIMES DAILY
Qty: 18.2 ML | Refills: 2 | Status: SHIPPED | OUTPATIENT
Start: 2025-03-06 | End: 2025-09-02

## 2025-03-06 RX ORDER — DULOXETIN HYDROCHLORIDE 20 MG/1
20 CAPSULE, DELAYED RELEASE ORAL DAILY
Qty: 90 CAPSULE | Refills: 1 | Status: SHIPPED | OUTPATIENT
Start: 2025-03-06

## 2025-03-23 RX ORDER — AZELASTINE 1 MG/ML
SPRAY, METERED NASAL
Qty: 30 ML | Refills: 1 | Status: SHIPPED | OUTPATIENT
Start: 2025-03-23

## 2025-04-17 RX ORDER — AZELASTINE 1 MG/ML
SPRAY, METERED NASAL
Qty: 30 ML | Refills: 1 | Status: SHIPPED | OUTPATIENT
Start: 2025-04-17

## 2025-05-12 RX ORDER — AZELASTINE 1 MG/ML
SPRAY, METERED NASAL
Qty: 30 ML | Refills: 1 | Status: SHIPPED | OUTPATIENT
Start: 2025-05-12

## 2025-06-04 RX ORDER — AZELASTINE 1 MG/ML
SPRAY, METERED NASAL
Qty: 30 ML | Refills: 1 | Status: SHIPPED | OUTPATIENT
Start: 2025-06-04

## 2025-06-23 ENCOUNTER — OFFICE VISIT (OUTPATIENT)
Dept: FAMILY MEDICINE | Facility: CLINIC | Age: 50
End: 2025-06-23
Payer: OTHER GOVERNMENT

## 2025-06-23 VITALS
HEIGHT: 72 IN | RESPIRATION RATE: 18 BRPM | OXYGEN SATURATION: 98 % | BODY MASS INDEX: 32.23 KG/M2 | DIASTOLIC BLOOD PRESSURE: 70 MMHG | WEIGHT: 238 LBS | HEART RATE: 66 BPM | SYSTOLIC BLOOD PRESSURE: 118 MMHG

## 2025-06-23 DIAGNOSIS — M50.30 DDD (DEGENERATIVE DISC DISEASE), CERVICAL: ICD-10-CM

## 2025-06-23 DIAGNOSIS — Z79.899 ENCOUNTER FOR LONG-TERM (CURRENT) USE OF MEDICATIONS: Primary | ICD-10-CM

## 2025-06-23 DIAGNOSIS — I10 ESSENTIAL HYPERTENSION: ICD-10-CM

## 2025-06-23 DIAGNOSIS — F32.A DEPRESSION, UNSPECIFIED DEPRESSION TYPE: ICD-10-CM

## 2025-06-23 DIAGNOSIS — J32.9 SINUSITIS, UNSPECIFIED CHRONICITY, UNSPECIFIED LOCATION: ICD-10-CM

## 2025-06-23 DIAGNOSIS — G43.909 MIGRAINE WITHOUT STATUS MIGRAINOSUS, NOT INTRACTABLE, UNSPECIFIED MIGRAINE TYPE: ICD-10-CM

## 2025-06-23 PROCEDURE — 99396 PREV VISIT EST AGE 40-64: CPT | Mod: S$GLB,,, | Performed by: PHYSICIAN ASSISTANT

## 2025-06-23 RX ORDER — GABAPENTIN 100 MG/1
100 CAPSULE ORAL 3 TIMES DAILY
Qty: 270 CAPSULE | Refills: 3 | Status: SHIPPED | OUTPATIENT
Start: 2025-06-23 | End: 2026-06-23

## 2025-06-23 RX ORDER — FLUTICASONE PROPIONATE 50 MCG
2 SPRAY, SUSPENSION (ML) NASAL 2 TIMES DAILY
Qty: 18.2 ML | Refills: 2 | Status: SHIPPED | OUTPATIENT
Start: 2025-06-23 | End: 2025-12-20

## 2025-06-23 RX ORDER — LISINOPRIL 10 MG/1
10 TABLET ORAL DAILY
Qty: 90 TABLET | Refills: 1 | Status: SHIPPED | OUTPATIENT
Start: 2025-06-23 | End: 2026-06-23

## 2025-06-23 RX ORDER — DULOXETIN HYDROCHLORIDE 20 MG/1
20 CAPSULE, DELAYED RELEASE ORAL DAILY
Qty: 90 CAPSULE | Refills: 1 | Status: SHIPPED | OUTPATIENT
Start: 2025-06-23

## 2025-06-23 RX ORDER — TADALAFIL 20 MG/1
20 TABLET ORAL DAILY
Qty: 12 TABLET | Refills: 2 | Status: SHIPPED | OUTPATIENT
Start: 2025-06-23 | End: 2025-09-21

## 2025-06-23 NOTE — PROGRESS NOTES
SUBJECTIVE:    Patient ID: Román Acosta Jr. is a 49 y.o. male.    Chief Complaint: Follow-up (No bottles//refills needed// pt states he have no complaints today )    History of Present Illness    CHIEF COMPLAINT:  Román presents today for follow up of chronic neck and shoulder pain.    HISTORY OF PRESENT ILLNESS:  He reports severe neck and shoulder pain that radiates to the bicep region, significantly impacting mobility and daily activities. Pain is particularly intense while driving. He uses a compression sleeve for management and reports Icy Hot patches have been ineffective. He takes OTC pain medication as needed and follows a home exercise program recommended by physical therapy.    MEDICAL HISTORY:  He has a history of neck injury sustained during  service, with subsequent development of cervical arthritis. His condition has been progressively worsening over time.    IMAGING:  X-rays from December showed multi-level degenerative changes in cervical spine, consistent with cervical arthritis, likely related to prior  injury.    LABS:  Cholesterol levels were slightly elevated in February 2024. Cologuard test was negative.    MEDICATIONS:  He takes Gabapentin 100mg daily in the morning (prescribed for TID) and Cymbalta 20mg daily. He takes all medications in the morning to ensure compliance.      ROS:  General: -fever, -chills, -fatigue, -weight gain, -weight loss  Eyes: -vision changes, -redness, -discharge  ENT: -ear pain, -nasal congestion, -sore throat  Cardiovascular: -chest pain, -palpitations, -lower extremity edema  Respiratory: -cough, -shortness of breath  Gastrointestinal: -abdominal pain, -nausea, -vomiting, -diarrhea, -constipation, -blood in stool  Genitourinary: -dysuria, -hematuria, -frequency  Musculoskeletal: -joint pain, -muscle pain, +neck pain, +limb pain, +pain with movement  Skin: -rash, -lesion  Neurological: -headache, -dizziness, -numbness, -tingling  Psychiatric:  -anxiety, -depression, -sleep difficulty         No visits with results within 6 Month(s) from this visit.   Latest known visit with results is:   Office Visit on 02/20/2024   Component Date Value Ref Range Status    Cologuard Result 02/25/2024 Negative  Negative Final       History reviewed. No pertinent past medical history.  History reviewed. No pertinent surgical history.  No family history on file.    Marital Status:   Alcohol History:  reports no history of alcohol use.  Tobacco History:  reports that he has been smoking. His smokeless tobacco use includes snuff.  Drug History:  has no history on file for drug use.    Review of patient's allergies indicates:  No Known Allergies  Current Medications[1]    Objective:      Vitals:    06/23/25 0750   BP: 118/70   Pulse: 66   Resp: 18   SpO2: 98%   Weight: 108 kg (238 lb)   Height: 6' (1.829 m)     Physical Exam    General: No acute distress. Well-developed. Well-nourished.  Eyes: EOMI. Sclerae anicteric.  HENT: Normocephalic. Atraumatic. Nares patent. Moist oral mucosa.  Ears: Bilateral TMs clear. Bilateral EACs clear.  Cardiovascular: Regular rate. Regular rhythm. No murmurs. No rubs. No gallops. Normal S1, S2.  Respiratory: Normal respiratory effort. Clear to auscultation bilaterally. No rales. No rhonchi. No wheezing.  Abdomen: Soft. Non-tender. Non-distended. Normoactive bowel sounds.  Musculoskeletal: No  obvious deformity.  Extremities: No lower extremity edema.  Neurological: Alert & oriented x3. No slurred speech. Normal gait.  Psychiatric: Normal mood. Normal affect. Good insight. Good judgment.  Skin: Warm. Dry. No rash.         Assessment:       Assessment & Plan    - Reviewed history of cervical spine issues, noting multi-level discogenic degenerative changes seen on previous XRs, likely related to past injury from  service.  - Evaluated ongoing neck and shoulder pain, acknowledging current management strategies including stretching and  use of compression sleeve.  - Assessed need for updated lab work, as last labs were from February 2024.  - Evaluated fasting status for potential immediate lab draw.  - Proceeded with labs despite patient having consumed coffee with sugar and creamer, as A1C test will be included which is not affected by recent food intake.    CERVICAL SPONDYLOSIS WITH RADICULOPATHY:  - Documented multi-level discogenic degenerative changes in the cervical spine with neck and shoulder pain that worsens over time, especially when driving or performing certain activities.  - Román reports pain in the arm and neck, possibly related to nerve issues, which has been managed with compression sleeve.  - Discussed the chronic nature of the condition and appropriate management strategies.    CERVICAL SPONDYLOSIS WITHOUT RADICULOPATHY:  - Documented multi-level discogenic degenerative changes in the cervical spine, indicating spondylosis with chronic neck pain, managed with compression sleeve and physical therapy.    HYPERLIPIDEMIA:  - Noted the patient's cholesterol was slightly elevated in previous laboratory studies, possibly due to dietary habits.  - Planned to recheck cholesterol levels with upcoming labs.    FOLLOW-UP AND OTHER MEDICATIONS:  - Ordered comprehensive laboratory studies including CBC, CMP, and A1C to monitor cholesterol and other health parameters.  - Refilled Cialis as needed.  - Continued Flonase and Cymbalta 20 mg daily.       Plan:       Encounter for long-term (current) use of medications  -     CBC W/ AUTO DIFFERENTIAL; Future; Expected date: 06/23/2025  -     COMPREHENSIVE METABOLIC PANEL; Future; Expected date: 06/23/2025  -     Lipid Panel; Future; Expected date: 06/23/2025  -     TSH w/reflex to FT4; Future; Expected date: 06/23/2025  -     Urinalysis, Reflex to Urine Culture Urine, Clean Catch; Future; Expected date: 06/23/2025  -     PSA, SCREENING; Future; Expected date: 06/23/2025  -     HEPATITIS C ANTIBODY;  Future; Expected date: 06/23/2025  -     HIV 1/2 Ag/Ab (4th Gen); Future; Expected date: 06/23/2025  -     HEMOGLOBIN A1C; Future; Expected date: 06/23/2025    Depression, unspecified depression type  Comments:  Mood and anxiety appears to be doing really well.  Continue Cymbalta as is. f/u 6 mos in person.  Orders:  -     DULoxetine (CYMBALTA) 20 MG capsule; Take 1 capsule (20 mg total) by mouth once daily.  Dispense: 90 capsule; Refill: 1    Sinusitis, unspecified chronicity, unspecified location  -     fluticasone propionate (FLONASE) 50 mcg/actuation nasal spray; 2 sprays (100 mcg total) by Each Nostril route 2 (two) times a day.  Dispense: 18.2 mL; Refill: 2    DDD (degenerative disc disease), cervical  -     gabapentin (NEURONTIN) 100 MG capsule; Take 1 capsule (100 mg total) by mouth 3 (three) times daily.  Dispense: 270 capsule; Refill: 3    Essential hypertension  Comments:  will increase dose to 10mg now and have him monitor at home.  Orders:  -     lisinopriL 10 MG tablet; Take 1 tablet (10 mg total) by mouth once daily.  Dispense: 90 tablet; Refill: 1    Migraine without status migrainosus, not intractable, unspecified migraine type  Comments:  no longer able to get ubrelvy now through insurance. He will keep what he has on hand for prn use if needed.  Orders:  -     tadalafiL (CIALIS) 20 MG Tab; Take 1 tablet (20 mg total) by mouth once daily.  Dispense: 12 tablet; Refill: 2      Follow up in about 6 months (around 12/23/2025).    This note was generated with the assistance of ambient listening technology. Verbal consent was obtained by the patient and accompanying visitor(s) for the recording of patient appointment to facilitate this note. I attest to having reviewed and edited the generated note for accuracy, though some syntax or spelling errors may persist. Please contact the author of this note for any clarification.          6/23/2025 Hector Devine PA-C      Answers submitted by the patient for  this visit:  Review of Systems Questionnaire (Submitted on 6/23/2025)  activity change: No  unexpected weight change: No  neck pain: Yes  hearing loss: No  rhinorrhea: No  trouble swallowing: No  eye discharge: No  visual disturbance: No  chest tightness: No  wheezing: No  chest pain: No  palpitations: No  blood in stool: No  constipation: No  vomiting: No  diarrhea: No  polydipsia: No  polyuria: No  difficulty urinating: No  urgency: No  hematuria: No  joint swelling: No  arthralgias: No  headaches: No  weakness: No  confusion: No  dysphoric mood: No         [1]   Current Outpatient Medications:     amLODIPine (NORVASC) 10 MG tablet, Take 1 tablet (10 mg total) by mouth once daily., Disp: 30 tablet, Rfl: 11    ascorbic acid, vitamin C, (VITAMIN C) 100 MG tablet, Take 100 mg by mouth once daily., Disp: , Rfl:     azelastine (ASTELIN) 137 mcg (0.1 %) nasal spray, SPRAY 2 SPRAYS BY NASAL ROUTE 2 TIMES DAILY., Disp: 30 mL, Rfl: 1    b complex vitamins capsule, Take 1 capsule by mouth once daily., Disp: , Rfl:     cetirizine HCl (ALLERGY RELIEF, CETIRIZINE, ORAL), Take by mouth., Disp: , Rfl:     co-enzyme Q-10 30 mg capsule, Take 30 mg by mouth 3 (three) times daily., Disp: , Rfl:     omeprazole (PRILOSEC) 20 MG capsule, Take 20 mg by mouth., Disp: , Rfl:     vitamin D (VITAMIN D3) 1000 units Tab, Take 1,000 Units by mouth once daily., Disp: , Rfl:     vitamin E 100 UNIT capsule, Take 100 Units by mouth once daily., Disp: , Rfl:     zinc gluconate 50 mg tablet, Take 50 mg by mouth once daily., Disp: , Rfl:     DULoxetine (CYMBALTA) 20 MG capsule, Take 1 capsule (20 mg total) by mouth once daily., Disp: 90 capsule, Rfl: 1    fluticasone propionate (FLONASE) 50 mcg/actuation nasal spray, 2 sprays (100 mcg total) by Each Nostril route 2 (two) times a day., Disp: 18.2 mL, Rfl: 2    gabapentin (NEURONTIN) 100 MG capsule, Take 1 capsule (100 mg total) by mouth 3 (three) times daily., Disp: 270 capsule, Rfl: 3    lisinopriL  10 MG tablet, Take 1 tablet (10 mg total) by mouth once daily., Disp: 90 tablet, Rfl: 1    tadalafiL (CIALIS) 20 MG Tab, Take 1 tablet (20 mg total) by mouth once daily., Disp: 12 tablet, Rfl: 2

## 2025-06-30 RX ORDER — AZELASTINE 1 MG/ML
SPRAY, METERED NASAL
Qty: 30 ML | Refills: 1 | Status: SHIPPED | OUTPATIENT
Start: 2025-06-30

## 2025-07-23 ENCOUNTER — PATIENT MESSAGE (OUTPATIENT)
Dept: FAMILY MEDICINE | Facility: CLINIC | Age: 50
End: 2025-07-23
Payer: OTHER GOVERNMENT

## 2025-07-24 RX ORDER — AZELASTINE 1 MG/ML
SPRAY, METERED NASAL
Qty: 30 ML | Refills: 1 | Status: SHIPPED | OUTPATIENT
Start: 2025-07-24

## 2025-08-25 RX ORDER — AZELASTINE 1 MG/ML
SPRAY, METERED NASAL
Qty: 90 ML | Refills: 1 | Status: SHIPPED | OUTPATIENT
Start: 2025-08-25

## 2025-08-29 ENCOUNTER — OFFICE VISIT (OUTPATIENT)
Dept: URGENT CARE | Facility: CLINIC | Age: 50
End: 2025-08-29
Payer: OTHER GOVERNMENT

## 2025-08-29 VITALS
RESPIRATION RATE: 16 BRPM | HEIGHT: 72 IN | BODY MASS INDEX: 32.23 KG/M2 | OXYGEN SATURATION: 96 % | SYSTOLIC BLOOD PRESSURE: 131 MMHG | WEIGHT: 238 LBS | TEMPERATURE: 98 F | HEART RATE: 65 BPM | DIASTOLIC BLOOD PRESSURE: 84 MMHG

## 2025-08-29 DIAGNOSIS — J01.90 ACUTE BACTERIAL SINUSITIS: Primary | ICD-10-CM

## 2025-08-29 DIAGNOSIS — J02.9 SORE THROAT: ICD-10-CM

## 2025-08-29 DIAGNOSIS — B96.89 ACUTE BACTERIAL SINUSITIS: Primary | ICD-10-CM

## 2025-08-29 LAB
CTP QC/QA: YES
FLUAV AG NPH QL: NEGATIVE
FLUBV AG NPH QL: NEGATIVE
S PYO RRNA THROAT QL PROBE: NEGATIVE
SARS-COV+SARS-COV-2 AG RESP QL IA.RAPID: NEGATIVE

## 2025-08-29 RX ORDER — DEXAMETHASONE SODIUM PHOSPHATE 4 MG/ML
8 INJECTION, SOLUTION INTRA-ARTICULAR; INTRALESIONAL; INTRAMUSCULAR; INTRAVENOUS; SOFT TISSUE
Status: COMPLETED | OUTPATIENT
Start: 2025-08-29 | End: 2025-08-29

## 2025-08-29 RX ORDER — AMOXICILLIN 875 MG/1
875 TABLET, COATED ORAL EVERY 12 HOURS
Qty: 10 TABLET | Refills: 0 | Status: SHIPPED | OUTPATIENT
Start: 2025-08-29 | End: 2025-09-03

## 2025-08-29 RX ADMIN — DEXAMETHASONE SODIUM PHOSPHATE 8 MG: 4 INJECTION, SOLUTION INTRA-ARTICULAR; INTRALESIONAL; INTRAMUSCULAR; INTRAVENOUS; SOFT TISSUE at 03:08
